# Patient Record
Sex: FEMALE | Race: WHITE | NOT HISPANIC OR LATINO | URBAN - METROPOLITAN AREA
[De-identification: names, ages, dates, MRNs, and addresses within clinical notes are randomized per-mention and may not be internally consistent; named-entity substitution may affect disease eponyms.]

---

## 2017-11-21 ENCOUNTER — INPATIENT (INPATIENT)
Facility: HOSPITAL | Age: 82
LOS: 5 days | Discharge: NURSING, REHAB OR LONG-TERM CARE | End: 2017-11-27
Attending: INTERNAL MEDICINE | Admitting: INTERNAL MEDICINE
Payer: MEDICARE

## 2017-11-21 VITALS
DIASTOLIC BLOOD PRESSURE: 72 MMHG | HEART RATE: 77 BPM | SYSTOLIC BLOOD PRESSURE: 123 MMHG | TEMPERATURE: 98 F | OXYGEN SATURATION: 99 % | RESPIRATION RATE: 20 BRPM

## 2017-11-21 DIAGNOSIS — F03.90 UNSPECIFIED DEMENTIA WITHOUT BEHAVIORAL DISTURBANCE: ICD-10-CM

## 2017-11-21 DIAGNOSIS — G92 TOXIC ENCEPHALOPATHY: ICD-10-CM

## 2017-11-21 DIAGNOSIS — Z29.9 ENCOUNTER FOR PROPHYLACTIC MEASURES, UNSPECIFIED: ICD-10-CM

## 2017-11-21 DIAGNOSIS — N39.0 URINARY TRACT INFECTION, SITE NOT SPECIFIED: ICD-10-CM

## 2017-11-21 DIAGNOSIS — G40.909 EPILEPSY, UNSPECIFIED, NOT INTRACTABLE, WITHOUT STATUS EPILEPTICUS: ICD-10-CM

## 2017-11-21 DIAGNOSIS — Z85.3 PERSONAL HISTORY OF MALIGNANT NEOPLASM OF BREAST: ICD-10-CM

## 2017-11-21 DIAGNOSIS — I10 ESSENTIAL (PRIMARY) HYPERTENSION: ICD-10-CM

## 2017-11-21 DIAGNOSIS — Z90.11 ACQUIRED ABSENCE OF RIGHT BREAST AND NIPPLE: Chronic | ICD-10-CM

## 2017-11-21 DIAGNOSIS — Z84.89 FAMILY HISTORY OF OTHER SPECIFIED CONDITIONS: Chronic | ICD-10-CM

## 2017-11-21 LAB
ALBUMIN SERPL ELPH-MCNC: 4.1 G/DL — SIGNIFICANT CHANGE UP (ref 3.3–5)
ALP SERPL-CCNC: 91 U/L — SIGNIFICANT CHANGE UP (ref 40–120)
ALT FLD-CCNC: 10 U/L — SIGNIFICANT CHANGE UP (ref 4–33)
APPEARANCE UR: SIGNIFICANT CHANGE UP
AST SERPL-CCNC: 19 U/L — SIGNIFICANT CHANGE UP (ref 4–32)
BACTERIA # UR AUTO: HIGH
BASOPHILS # BLD AUTO: 0.03 K/UL — SIGNIFICANT CHANGE UP (ref 0–0.2)
BASOPHILS NFR BLD AUTO: 0.5 % — SIGNIFICANT CHANGE UP (ref 0–2)
BILIRUB SERPL-MCNC: 0.3 MG/DL — SIGNIFICANT CHANGE UP (ref 0.2–1.2)
BILIRUB UR-MCNC: NEGATIVE — SIGNIFICANT CHANGE UP
BLOOD UR QL VISUAL: NEGATIVE — SIGNIFICANT CHANGE UP
BUN SERPL-MCNC: 12 MG/DL — SIGNIFICANT CHANGE UP (ref 7–23)
CALCIUM SERPL-MCNC: 8.5 MG/DL — SIGNIFICANT CHANGE UP (ref 8.4–10.5)
CHLORIDE SERPL-SCNC: 97 MMOL/L — LOW (ref 98–107)
CO2 SERPL-SCNC: 29 MMOL/L — SIGNIFICANT CHANGE UP (ref 22–31)
COLOR SPEC: YELLOW — SIGNIFICANT CHANGE UP
CREAT SERPL-MCNC: 0.46 MG/DL — LOW (ref 0.5–1.3)
EOSINOPHIL # BLD AUTO: 0.02 K/UL — SIGNIFICANT CHANGE UP (ref 0–0.5)
EOSINOPHIL NFR BLD AUTO: 0.4 % — SIGNIFICANT CHANGE UP (ref 0–6)
GLUCOSE SERPL-MCNC: 87 MG/DL — SIGNIFICANT CHANGE UP (ref 70–99)
GLUCOSE UR-MCNC: NEGATIVE — SIGNIFICANT CHANGE UP
HCT VFR BLD CALC: 41.9 % — SIGNIFICANT CHANGE UP (ref 34.5–45)
HGB BLD-MCNC: 12.9 G/DL — SIGNIFICANT CHANGE UP (ref 11.5–15.5)
HYALINE CASTS # UR AUTO: SIGNIFICANT CHANGE UP (ref 0–?)
IMM GRANULOCYTES # BLD AUTO: 0.02 # — SIGNIFICANT CHANGE UP
IMM GRANULOCYTES NFR BLD AUTO: 0.4 % — SIGNIFICANT CHANGE UP (ref 0–1.5)
KETONES UR-MCNC: NEGATIVE — SIGNIFICANT CHANGE UP
LEUKOCYTE ESTERASE UR-ACNC: HIGH
LYMPHOCYTES # BLD AUTO: 0.76 K/UL — LOW (ref 1–3.3)
LYMPHOCYTES # BLD AUTO: 13.6 % — SIGNIFICANT CHANGE UP (ref 13–44)
MCHC RBC-ENTMCNC: 21.8 PG — LOW (ref 27–34)
MCHC RBC-ENTMCNC: 30.8 % — LOW (ref 32–36)
MCV RBC AUTO: 70.7 FL — LOW (ref 80–100)
MONOCYTES # BLD AUTO: 0.41 K/UL — SIGNIFICANT CHANGE UP (ref 0–0.9)
MONOCYTES NFR BLD AUTO: 7.3 % — SIGNIFICANT CHANGE UP (ref 2–14)
MUCOUS THREADS # UR AUTO: SIGNIFICANT CHANGE UP
NEUTROPHILS # BLD AUTO: 4.34 K/UL — SIGNIFICANT CHANGE UP (ref 1.8–7.4)
NEUTROPHILS NFR BLD AUTO: 77.8 % — HIGH (ref 43–77)
NITRITE UR-MCNC: POSITIVE — HIGH
NRBC # FLD: 0 — SIGNIFICANT CHANGE UP
PH UR: 6.5 — SIGNIFICANT CHANGE UP (ref 4.6–8)
PHENOBARB SERPL-MCNC: 24.6 UG/ML — SIGNIFICANT CHANGE UP (ref 10–40)
PLATELET # BLD AUTO: 239 K/UL — SIGNIFICANT CHANGE UP (ref 150–400)
PMV BLD: 9.9 FL — SIGNIFICANT CHANGE UP (ref 7–13)
POTASSIUM SERPL-MCNC: 5 MMOL/L — SIGNIFICANT CHANGE UP (ref 3.5–5.3)
POTASSIUM SERPL-SCNC: 5 MMOL/L — SIGNIFICANT CHANGE UP (ref 3.5–5.3)
PROT SERPL-MCNC: 7.5 G/DL — SIGNIFICANT CHANGE UP (ref 6–8.3)
PROT UR-MCNC: 10 — SIGNIFICANT CHANGE UP
RBC # BLD: 5.93 M/UL — HIGH (ref 3.8–5.2)
RBC # FLD: 14.9 % — HIGH (ref 10.3–14.5)
RBC CASTS # UR COMP ASSIST: HIGH (ref 0–?)
SODIUM SERPL-SCNC: 135 MMOL/L — SIGNIFICANT CHANGE UP (ref 135–145)
SP GR SPEC: 1.02 — SIGNIFICANT CHANGE UP (ref 1–1.03)
SQUAMOUS # UR AUTO: SIGNIFICANT CHANGE UP
UROBILINOGEN FLD QL: NORMAL E.U. — SIGNIFICANT CHANGE UP (ref 0.1–0.2)
WBC # BLD: 5.58 K/UL — SIGNIFICANT CHANGE UP (ref 3.8–10.5)
WBC # FLD AUTO: 5.58 K/UL — SIGNIFICANT CHANGE UP (ref 3.8–10.5)
WBC CLUMPS #/AREA URNS HPF: PRESENT — HIGH (ref 0–?)
WBC UR QL: >50 — HIGH (ref 0–?)
YEAST BUDDING # UR COMP ASSIST: SIGNIFICANT CHANGE UP

## 2017-11-21 PROCEDURE — 70450 CT HEAD/BRAIN W/O DYE: CPT | Mod: 26

## 2017-11-21 PROCEDURE — 71010: CPT | Mod: 26

## 2017-11-21 PROCEDURE — 99223 1ST HOSP IP/OBS HIGH 75: CPT

## 2017-11-21 RX ORDER — CEFTRIAXONE 500 MG/1
1 INJECTION, POWDER, FOR SOLUTION INTRAMUSCULAR; INTRAVENOUS ONCE
Qty: 0 | Refills: 0 | Status: COMPLETED | OUTPATIENT
Start: 2017-11-21 | End: 2017-11-21

## 2017-11-21 RX ORDER — FOLIC ACID 0.8 MG
1 TABLET ORAL DAILY
Qty: 0 | Refills: 0 | Status: DISCONTINUED | OUTPATIENT
Start: 2017-11-21 | End: 2017-11-27

## 2017-11-21 RX ORDER — CHOLECALCIFEROL (VITAMIN D3) 125 MCG
5000 CAPSULE ORAL DAILY
Qty: 0 | Refills: 0 | Status: DISCONTINUED | OUTPATIENT
Start: 2017-11-21 | End: 2017-11-27

## 2017-11-21 RX ORDER — SENNA PLUS 8.6 MG/1
2 TABLET ORAL AT BEDTIME
Qty: 0 | Refills: 0 | Status: DISCONTINUED | OUTPATIENT
Start: 2017-11-21 | End: 2017-11-27

## 2017-11-21 RX ORDER — RALOXIFENE HYDROCHLORIDE 60 MG/1
60 TABLET, COATED ORAL DAILY
Qty: 0 | Refills: 0 | Status: DISCONTINUED | OUTPATIENT
Start: 2017-11-21 | End: 2017-11-27

## 2017-11-21 RX ORDER — CEFTRIAXONE 500 MG/1
1000 INJECTION, POWDER, FOR SOLUTION INTRAMUSCULAR; INTRAVENOUS ONCE
Qty: 0 | Refills: 0 | Status: DISCONTINUED | OUTPATIENT
Start: 2017-11-21 | End: 2017-11-21

## 2017-11-21 RX ORDER — DONEPEZIL HYDROCHLORIDE 10 MG/1
10 TABLET, FILM COATED ORAL AT BEDTIME
Qty: 0 | Refills: 0 | Status: DISCONTINUED | OUTPATIENT
Start: 2017-11-21 | End: 2017-11-27

## 2017-11-21 RX ORDER — HEPARIN SODIUM 5000 [USP'U]/ML
5000 INJECTION INTRAVENOUS; SUBCUTANEOUS EVERY 8 HOURS
Qty: 0 | Refills: 0 | Status: DISCONTINUED | OUTPATIENT
Start: 2017-11-21 | End: 2017-11-27

## 2017-11-21 RX ORDER — LAMOTRIGINE 25 MG/1
600 TABLET, ORALLY DISINTEGRATING ORAL DAILY
Qty: 0 | Refills: 0 | Status: DISCONTINUED | OUTPATIENT
Start: 2017-11-21 | End: 2017-11-25

## 2017-11-21 RX ORDER — PHENOBARBITAL 60 MG
64.8 TABLET ORAL EVERY 12 HOURS
Qty: 0 | Refills: 0 | Status: DISCONTINUED | OUTPATIENT
Start: 2017-11-21 | End: 2017-11-23

## 2017-11-21 RX ORDER — SODIUM CHLORIDE 9 MG/ML
1 INJECTION INTRAMUSCULAR; INTRAVENOUS; SUBCUTANEOUS DAILY
Qty: 0 | Refills: 0 | Status: DISCONTINUED | OUTPATIENT
Start: 2017-11-21 | End: 2017-11-27

## 2017-11-21 RX ORDER — DOCUSATE SODIUM 100 MG
100 CAPSULE ORAL THREE TIMES A DAY
Qty: 0 | Refills: 0 | Status: DISCONTINUED | OUTPATIENT
Start: 2017-11-21 | End: 2017-11-27

## 2017-11-21 RX ORDER — AMLODIPINE BESYLATE 2.5 MG/1
5 TABLET ORAL DAILY
Qty: 0 | Refills: 0 | Status: DISCONTINUED | OUTPATIENT
Start: 2017-11-21 | End: 2017-11-27

## 2017-11-21 RX ADMIN — Medication 64.8 MILLIGRAM(S): at 18:46

## 2017-11-21 RX ADMIN — DONEPEZIL HYDROCHLORIDE 10 MILLIGRAM(S): 10 TABLET, FILM COATED ORAL at 22:18

## 2017-11-21 RX ADMIN — Medication 100 MILLIGRAM(S): at 22:18

## 2017-11-21 RX ADMIN — HEPARIN SODIUM 5000 UNIT(S): 5000 INJECTION INTRAVENOUS; SUBCUTANEOUS at 22:19

## 2017-11-21 RX ADMIN — CEFTRIAXONE 100 GRAM(S): 500 INJECTION, POWDER, FOR SOLUTION INTRAMUSCULAR; INTRAVENOUS at 13:02

## 2017-11-21 NOTE — ED PROVIDER NOTE - PROGRESS NOTE DETAILS
UA + for UTI. CTH neg. Will give dose of ceftriaxone in ED. Will need admission for inpt tx of UTI, poss urosepsis. Ucx and blood cx pending. Discussed case with pt's PCP Dr. Samir Jaramillo. Would like admission to hospitalist. Discussed case with hospitalist Dr. Huong Prado. Will admit for tx of UTI.

## 2017-11-21 NOTE — PATIENT PROFILE ADULT. - VISION (WITH CORRECTIVE LENSES IF THE PATIENT USUALLY WEARS THEM):
Unable to assess (HERNANDEZ) Pt. does not respond when asked and has garbled speech/Normal vision: sees adequately in most situations; can see medication labels, newsprint

## 2017-11-21 NOTE — ED ADULT TRIAGE NOTE - NS ED NOTE AC HIGH RISK COUNTRIES
1.  Date/reason for appt: 10/10/17 IBD Sx's   2.  Referring provider: Maksim FLORES   3.  Call to patient (Yes / No - short description): No, pt was referred.   4.  Previous care at / records requested from:  [facility] FLOR[facility] -- Recs are in Epic / Images in PACS   [facility] Donna ESTRADA -- Recs are in Epic   Colonoscopy 10/08/15  Transferred recs scanned in Epic 2015    No

## 2017-11-21 NOTE — ED PROVIDER NOTE - MEDICAL DECISION MAKING DETAILS
81yo F hx of severe intellectual disability, schizophrenia, seizure d/o, osteoporosis, peripheral neuropathy p/w AMS x1hr. Ddx include infection, sz, dementia, ACS. Eval with labs, ekg, cxr, cthead, ua, lamotrigine and phenobarb levels, reassess.

## 2017-11-21 NOTE — H&P ADULT - PROBLEM SELECTOR PLAN 3
c/w Amlodipine for HTN, BP satisfactory right now c/w Phenobarbital and Lamictal at scheduled doses for home, not actively seizing  CT Head negative on initial triage for lethargy c/w Phenobarbital and Lamictal at scheduled doses for home, not actively seizing  CT Head negative on initial triage for acute intracranial hemorrhage or masses

## 2017-11-21 NOTE — ED ADULT TRIAGE NOTE - CHIEF COMPLAINT QUOTE
Pt from adult home sent in for increased lethargy. Pt with slurred speech at base line.  Pt is afebrile finger stick 106.

## 2017-11-21 NOTE — H&P ADULT - PROBLEM SELECTOR PLAN 1
(+) UA and suprapubic tenderness on physical exam. Would c/w Rocephin and follow up urine cultures. Patient is not septic at this time.   - CBC daily  - Vitals q6h Patient with altered mental status in the form of lethargy and decreased interaction on presentation. As per HHA, this has now resolved after antibiotic therapy. Likely due to UTI. C/w abx and conitnue to monitor clinical progress

## 2017-11-21 NOTE — ED ADULT NURSE NOTE - OBJECTIVE STATEMENT
Patient received into room 4 AA&Ox0 with mental disability sent from facility for lethargy with aide at beside providing history. Per aide, pt. was lethargic/sluggish, and not eating for about an hour at approximately 0800 but returned to baseline with no interventions. VSS on RA. Patient presents to ED with no non-verbal s/x of chest pain, N/V, SOB, fever, chills at this time. Patient is incontinent x2, on bedrest to chair with stephanie lift at nursing home, and presents to ED with [id number] small blanchable unopened bump to sacral area. 22g PIV in place to left hand, labs drawn - will continue to monitor.

## 2017-11-21 NOTE — H&P ADULT - NSHPPHYSICALEXAM_GEN_ALL_CORE
Vital Signs Last 24 Hrs  T(C): 36.9 (21 Nov 2017 11:40), Max: 36.9 (21 Nov 2017 11:40)  T(F): 98.4 (21 Nov 2017 11:40), Max: 98.4 (21 Nov 2017 11:40)  HR: 74 (21 Nov 2017 11:40) (74 - 77)  BP: 141/84 (21 Nov 2017 11:40) (123/72 - 141/84)  BP(mean): --  RR: 18 (21 Nov 2017 11:40) (18 - 20)  SpO2: 99% (21 Nov 2017 11:40) (99% - 99%)    General: NAD, non-toxic appearing, (+) repetitive movement of lips in circular motion, unintelligble speech but awake and interactive, calm, appears younger than stated age   HEENT: EOMI, PERRLA, no conjunctival pallor, MMM, no JVD, no thyromegaly, neck supple, trachea midline  CV: S1S2 RRR no MRG  Lungs: CTA BL  Abdomen: soft ND +BS, (+) grimaced on palpation of the suprapubic area  Extremities: No CCE +WWP  Skin/MSK: No rashes, preserved ROM on active & passive movement  Neuro: AAOx0, no focal deficits, no sensory deficits

## 2017-11-21 NOTE — H&P ADULT - ASSESSMENT
82F hx of Intellectual disability, Seizure disorder, Schizophrenia, Osteoporosis, Breast CA s/p Right Mastectomy, Peripheral Neuropathy presents to [facility] from group home for lethargy.  Found to have urinary tract infection

## 2017-11-21 NOTE — H&P ADULT - HISTORY OF PRESENT ILLNESS
82F hx of Intellectual disability, Seizure disorder, Schizophrenia, Osteoporosis, Hyponatremia, Breast CA s/p Right Mastectomy, Peripheral Neuropathy presents to [facility] from group home for lethargy. Hx obtained from aide given patient's intellectually disability and inability to participate in full discourse. As per the health aide, the patient was in her usual state of health with no symptoms until this morning. She slept well overnight. The patient's usual routine is to have breakfast and to be awake, alert, and interactive albeit nonintelligible speech. However this morning the patient was having a difficult time waking up, and would not eat. Her personality change was alarming to the HHA and thus she was brought to the ER. Upon review of the documentation from the group home, her vitals were within normal limits and no fever.     In the ED patient was given 1g IV Ceftriaxone x 1 (13:00).     Unable to interview patient given intellectual disability. HHA did not observe any coughing, diarrhea for patient at the home.

## 2017-11-21 NOTE — ED PROVIDER NOTE - OBJECTIVE STATEMENT
83yo F hx of severe intellectual disability, schizophrenia, seizure d/o, osteoperosis, peripheral neuropathy presenting from nursing home for lethargy. History gathered from health aide. Unable to understand pt 2/2 mumbled speech (baseline for pt). Health aide reports around 8am, pt was lethargic, acting very tired, sluggish, not eating, not very responsive. Lasted about an hour. Did not give pt anything and pt has subsequently returned to baseline. Pt at baseline has a dry cough from former smoking, which hasn't changed. No recent illnesses, change in meds, fevers/chills, vomiting, constipation, diarrhea, abdominal pain, urinary symptoms, rashes. Pt is supposed to wear CPAP at night but pt does not comply.

## 2017-11-21 NOTE — H&P ADULT - PROBLEM SELECTOR PLAN 2
c/w Phenobarbital and Lamictal at scheduled doses for home, not actively seizing  CT Head negative on initial triage for lethargy (+) UA and suprapubic tenderness on physical exam. Would c/w Rocephin and follow up urine cultures. Patient is not septic at this time.   - CBC daily  - Vitals q6h

## 2017-11-21 NOTE — ED PROVIDER NOTE - NS ED ROS FT
Constitutional: no fevers, chills  HEENT: no visual changes, no sore throat, no rhinorrhea  CV: no cp  Resp: no sob  GI: no abd pain, n/v, diarrhea/constipation  : no dysuria, hematuria  MSK: no joint pains  skin: no rashes  neuro: no HA, + lethargy  psych: no SI/HI

## 2017-11-21 NOTE — ED PROVIDER NOTE - ATTENDING CONTRIBUTION TO CARE
Farhan pt sent from nursing home with reported AMS  presently at baseline  acc to aid at bedside  Aid says  looked sleepy this am  This has happened before  Pt baseline disoriented Farhan pt sent from nursing home with reported AMS  presently at baseline  acc to aid at bedside  Aid says  looked sleepy this am,  lasted about 1 hr  no sz activity observed    Pt baseline disoriented  and acc to aid seems at baseline now  exam pt disoriented  speaking but not answering question  RR nl faint ant rhonchi  benign ab  moves both sides with stimulation  hands held flexed at wrists  left hip int rotated but able to range passively and moves it with stimulation    CXR clear  Urine positive Farhan pt sent from nursing home with reported AMS  presently at baseline  acc to aid at bedside  Aid says  looked sleepy this am,  lasted about 1 hr  no sz activity observed    Pt baseline disoriented  and acc to aid seems at baseline now  exam pt disoriented  speaking but not answering question  RR nl faint ant rhonchi  benign ab  moves both sides with stimulation  hands held flexed at wrists  left hip int rotated but able to range passively and moves it with stimulation    CXR clear  Urine positive  Imp  UTI  transient AMS   ?related to infection  pts baseline poor so difficult to assess well  would keep for antibiotics  serial assessments

## 2017-11-21 NOTE — H&P ADULT - PMH
Dementia    Hearing loss    Osteoporosis    Peripheral neuropathy    Schizophrenia, unspecified type

## 2017-11-21 NOTE — H&P ADULT - NSHPLABSRESULTS_GEN_ALL_CORE
CBC Full  -  ( 21 Nov 2017 11:35 )  WBC Count : 5.58 K/uL  Hemoglobin : 12.9 g/dL  Hematocrit : 41.9 %  Platelet Count - Automated : 239 K/uL  Mean Cell Volume : 70.7 fL  Mean Cell Hemoglobin : 21.8 pg  Mean Cell Hemoglobin Concentration : 30.8 %  Auto Neutrophil # : 4.34 K/uL  Auto Lymphocyte # : 0.76 K/uL  Auto Monocyte # : 0.41 K/uL  Auto Eosinophil # : 0.02 K/uL  Auto Basophil # : 0.03 K/uL  Auto Neutrophil % : 77.8 %  Auto Lymphocyte % : 13.6 %  Auto Monocyte % : 7.3 %  Auto Eosinophil % : 0.4 %  Auto Basophil % : 0.5 %    11-21    135  |  97<L>  |  12  ----------------------------<  87  5.0   |  29  |  0.46<L>    Ca    8.5      21 Nov 2017 11:35    TPro  7.5  /  Alb  4.1  /  TBili  0.3  /  DBili  x   /  AST  19  /  ALT  10  /  AlkPhos  91 11-21    CBC Full  -  ( 21 Nov 2017 11:35 )  WBC Count : 5.58 K/uL  Hemoglobin : 12.9 g/dL  Hematocrit : 41.9 %  Platelet Count - Automated : 239 K/uL  Mean Cell Volume : 70.7 fL  Mean Cell Hemoglobin : 21.8 pg  Mean Cell Hemoglobin Concentration : 30.8 %  Auto Neutrophil # : 4.34 K/uL  Auto Lymphocyte # : 0.76 K/uL  Auto Monocyte # : 0.41 K/uL  Auto Eosinophil # : 0.02 K/uL  Auto Basophil # : 0.03 K/uL  Auto Neutrophil % : 77.8 %  Auto Lymphocyte % : 13.6 %  Auto Monocyte % : 7.3 %  Auto Eosinophil % : 0.4 %  Auto Basophil % : 0.5 %    11-21    135  |  97<L>  |  12  ----------------------------<  87  5.0   |  29  |  0.46<L>    Ca    8.5      21 Nov 2017 11:35    TPro  7.5  /  Alb  4.1  /  TBili  0.3  /  DBili  x   /  AST  19  /  ALT  10  /  AlkPhos  91  11-21    Urinalysis (11.21.17 @ 11:09)    Color: YELLOW    Urine Appearance: CLOUDY    Glucose: NEGATIVE    Bilirubin: NEGATIVE    Ketone - Urine: NEGATIVE    Specific Gravity: 1.017    Blood: NEGATIVE    pH - Urine: 6.5    Protein, Urine: 10    Urobilinogen: NORMAL E.U.    Nitrite: POSITIVE    Leukocyte Esterase Concentration: LARGE    Red Blood Cell - Urine: 5-10    White Blood Cell - Urine: >50    Hyaline Casts: 0-2    Mucus: FEW    White Blood Cell Clump: PRESENT    Bacteria: MANY    Budding Yeast: FEW    Squamous Epithelial: OCC    Phenobarbital Level, Serum (11.21.17 @ 11:35)    Phenobarbital Level, Serum: 24.6 ug/mL    < from: CT Head No Cont (11.21.17 @ 12:47) >  IMPRESSION:  No acute intracranial hemorrhage, mass or hydrocephalus.  Mild microvascular ischemic change.  ELVA PETERS M.D., ATTENDING RADIOLOGIST  This document has been electronically signed. Nov 21 2017  1:08PM   < end of copied text >    CXR: No effusions, poor technique as head obscures upper lobes, no focal consolidations on lower lobes

## 2017-11-21 NOTE — PATIENT PROFILE ADULT. - ABILITY TO HEAR (WITH HEARING AID OR HEARING APPLIANCE IF NORMALLY USED):
Unable to assess (HERNANDEZ) Pt. does not respond when asked and has garbled speech/Unable to assess hearing

## 2017-11-21 NOTE — ED PROVIDER NOTE - PHYSICAL EXAMINATION
Vitals: WNL  Gen: laying in bed with incoherent mumbling  Head: NCAT  ENT: sclerae white, anicterus, moist mucous membranes. No exudates  CV: RRR. Audible S1 and S2. No murmurs, rubs, gallops, S3, nor S4, 2+ radial and DP pulses   Pulm: crackles RLL, remainder of lungs CTA  Abd: soft, normoactive BS x4, NTND, no rebound, no guarding, no rashes  Musculoskeletal:  No peripheral edema, severe thoracic kyphosis  Skin: no lesions or scars noted  Neurologic: pt able to verbalize name, incoherent mumbling, unable to understand pt 2/2 pt's mumbling and intellectual disability, pt does not follow commands for neuro exam, EOMI

## 2017-11-22 LAB
SPECIMEN SOURCE: SIGNIFICANT CHANGE UP

## 2017-11-22 PROCEDURE — 99233 SBSQ HOSP IP/OBS HIGH 50: CPT

## 2017-11-22 RX ORDER — CEFTRIAXONE 500 MG/1
1 INJECTION, POWDER, FOR SOLUTION INTRAMUSCULAR; INTRAVENOUS EVERY 24 HOURS
Qty: 0 | Refills: 0 | Status: DISCONTINUED | OUTPATIENT
Start: 2017-11-22 | End: 2017-11-24

## 2017-11-22 RX ADMIN — Medication 1 MILLIGRAM(S): at 12:29

## 2017-11-22 RX ADMIN — SODIUM CHLORIDE 1 GRAM(S): 9 INJECTION INTRAMUSCULAR; INTRAVENOUS; SUBCUTANEOUS at 12:29

## 2017-11-22 RX ADMIN — Medication 64.8 MILLIGRAM(S): at 05:34

## 2017-11-22 RX ADMIN — Medication 100 MILLIGRAM(S): at 21:17

## 2017-11-22 RX ADMIN — Medication 5000 UNIT(S): at 12:28

## 2017-11-22 RX ADMIN — RALOXIFENE HYDROCHLORIDE 60 MILLIGRAM(S): 60 TABLET, COATED ORAL at 12:28

## 2017-11-22 RX ADMIN — Medication 100 MILLIGRAM(S): at 05:37

## 2017-11-22 RX ADMIN — AMLODIPINE BESYLATE 5 MILLIGRAM(S): 2.5 TABLET ORAL at 05:34

## 2017-11-22 RX ADMIN — HEPARIN SODIUM 5000 UNIT(S): 5000 INJECTION INTRAVENOUS; SUBCUTANEOUS at 05:37

## 2017-11-22 RX ADMIN — HEPARIN SODIUM 5000 UNIT(S): 5000 INJECTION INTRAVENOUS; SUBCUTANEOUS at 16:06

## 2017-11-22 RX ADMIN — DONEPEZIL HYDROCHLORIDE 10 MILLIGRAM(S): 10 TABLET, FILM COATED ORAL at 21:17

## 2017-11-22 RX ADMIN — Medication 100 MILLIGRAM(S): at 16:06

## 2017-11-22 RX ADMIN — Medication 1 TABLET(S): at 12:29

## 2017-11-22 RX ADMIN — HEPARIN SODIUM 5000 UNIT(S): 5000 INJECTION INTRAVENOUS; SUBCUTANEOUS at 21:17

## 2017-11-22 RX ADMIN — CEFTRIAXONE 100 GRAM(S): 500 INJECTION, POWDER, FOR SOLUTION INTRAMUSCULAR; INTRAVENOUS at 16:06

## 2017-11-22 RX ADMIN — LAMOTRIGINE 600 MILLIGRAM(S): 25 TABLET, ORALLY DISINTEGRATING ORAL at 14:21

## 2017-11-22 RX ADMIN — Medication 64.8 MILLIGRAM(S): at 17:32

## 2017-11-22 RX ADMIN — SENNA PLUS 2 TABLET(S): 8.6 TABLET ORAL at 21:17

## 2017-11-22 NOTE — PROGRESS NOTE ADULT - SUBJECTIVE AND OBJECTIVE BOX
Patient is a 82y old  Female who presents with a chief complaint of Lethargy (2017 14:56)        SUBJECTIVE / OVERNIGHT EVENTS:  no acute events o/n  unable to interview pt given intellectual disability  no apparent distress     MEDICATIONS  (STANDING):  amLODIPine   Tablet 5 milliGRAM(s) Oral daily  calcium carbonate 1250 mG + Vitamin D (OsCal 500 + D) 1 Tablet(s) Oral daily  cefTRIAXone   IVPB 1 Gram(s) IV Intermittent every 24 hours  cholecalciferol 5000 Unit(s) Oral daily  docusate sodium 100 milliGRAM(s) Oral three times a day  donepezil 10 milliGRAM(s) Oral at bedtime  folic acid 1 milliGRAM(s) Oral daily  heparin  Injectable 5000 Unit(s) SubCutaneous every 8 hours  lamoTRIgine  milliGRAM(s) Oral daily  PHENobarbital 64.8 milliGRAM(s) Oral every 12 hours  raloxifene 60 milliGRAM(s) Oral daily  senna 2 Tablet(s) Oral at bedtime  sodium chloride 1 Gram(s) Oral daily    MEDICATIONS  (PRN):      Vital Signs Last 24 Hrs  T(C): 36.4 (2017 05:27), Max: 36.8 (2017 15:33)  T(F): 97.6 (2017 05:27), Max: 98.2 (2017 15:33)  HR: 77 (2017 05:27) (65 - 84)  BP: 150/87 (2017 05:27) (138/79 - 150/87)  BP(mean): --  RR: 17 (2017 05:27) (17 - 18)  SpO2: 95% (2017 05:27) (95% - 100%)  CAPILLARY BLOOD GLUCOSE        I&O's Summary      General: NAD  HEENT: EOMI, PERRLA  CV: S1S2 RRR no MRG  Lungs: CTA BL  Abdomen: soft NT/ND +BS   Extremities: No CCE  Skin/MSK: No rashes, preserved ROM on active & passive movement  Neuro: AAOx0, no focal deficits, no sensory deficits        LABS:                        12.9   5.58  )-----------( 239      ( 2017 11:35 )             41.9     11-21    135  |  97<L>  |  12  ----------------------------<  87  5.0   |  29  |  0.46<L>    Ca    8.5      2017 11:35    TPro  7.5  /  Alb  4.1  /  TBili  0.3  /  DBili  x   /  AST  19  /  ALT  10  /  AlkPhos  91  11-21          Urinalysis Basic - ( 2017 11:09 )    Color: YELLOW / Appearance: CLOUDY / S.017 / pH: 6.5  Gluc: NEGATIVE / Ketone: NEGATIVE  / Bili: NEGATIVE / Urobili: NORMAL E.U.   Blood: NEGATIVE / Protein: 10 / Nitrite: POSITIVE   Leuk Esterase: LARGE / RBC: 5-10 / WBC >50   Sq Epi: OCC / Non Sq Epi: x / Bacteria: MANY        RADIOLOGY & ADDITIONAL TESTS:    Imaging Personally Reviewed:  Consultant(s) Notes Reviewed:    Care Discussed with Consultants/Other Providers:

## 2017-11-23 LAB
-  AMIKACIN: SIGNIFICANT CHANGE UP
-  AMPICILLIN/SULBACTAM: SIGNIFICANT CHANGE UP
-  AMPICILLIN: SIGNIFICANT CHANGE UP
-  AZTREONAM: SIGNIFICANT CHANGE UP
-  CEFAZOLIN: SIGNIFICANT CHANGE UP
-  CEFEPIME: SIGNIFICANT CHANGE UP
-  CEFOXITIN: SIGNIFICANT CHANGE UP
-  CEFTAZIDIME: SIGNIFICANT CHANGE UP
-  CEFTRIAXONE: SIGNIFICANT CHANGE UP
-  CIPROFLOXACIN: SIGNIFICANT CHANGE UP
-  ERTAPENEM: SIGNIFICANT CHANGE UP
-  GENTAMICIN: SIGNIFICANT CHANGE UP
-  IMIPENEM: SIGNIFICANT CHANGE UP
-  LEVOFLOXACIN: SIGNIFICANT CHANGE UP
-  MEROPENEM: SIGNIFICANT CHANGE UP
-  NITROFURANTOIN: SIGNIFICANT CHANGE UP
-  PIPERACILLIN/TAZOBACTAM: SIGNIFICANT CHANGE UP
-  TIGECYCLINE: SIGNIFICANT CHANGE UP
-  TOBRAMYCIN: SIGNIFICANT CHANGE UP
-  TRIMETHOPRIM/SULFAMETHOXAZOLE: SIGNIFICANT CHANGE UP
BACTERIA UR CULT: SIGNIFICANT CHANGE UP
BUN SERPL-MCNC: 11 MG/DL — SIGNIFICANT CHANGE UP (ref 7–23)
CALCIUM SERPL-MCNC: 8.3 MG/DL — LOW (ref 8.4–10.5)
CHLORIDE SERPL-SCNC: 95 MMOL/L — LOW (ref 98–107)
CO2 SERPL-SCNC: 29 MMOL/L — SIGNIFICANT CHANGE UP (ref 22–31)
CREAT SERPL-MCNC: 0.34 MG/DL — LOW (ref 0.5–1.3)
GLUCOSE SERPL-MCNC: 90 MG/DL — SIGNIFICANT CHANGE UP (ref 70–99)
HCT VFR BLD CALC: 43.1 % — SIGNIFICANT CHANGE UP (ref 34.5–45)
HGB BLD-MCNC: 12.9 G/DL — SIGNIFICANT CHANGE UP (ref 11.5–15.5)
LAMOTRIGINE SERPL-MCNC: 11.8 MCG/ML — SIGNIFICANT CHANGE UP (ref 2.5–15)
MCHC RBC-ENTMCNC: 21.4 PG — LOW (ref 27–34)
MCHC RBC-ENTMCNC: 29.9 % — LOW (ref 32–36)
MCV RBC AUTO: 71.6 FL — LOW (ref 80–100)
METHOD TYPE: SIGNIFICANT CHANGE UP
NRBC # FLD: 0 — SIGNIFICANT CHANGE UP
ORGANISM # SPEC MICROSCOPIC CNT: SIGNIFICANT CHANGE UP
ORGANISM # SPEC MICROSCOPIC CNT: SIGNIFICANT CHANGE UP
PLATELET # BLD AUTO: 243 K/UL — SIGNIFICANT CHANGE UP (ref 150–400)
PMV BLD: 10.2 FL — SIGNIFICANT CHANGE UP (ref 7–13)
POTASSIUM SERPL-MCNC: 4.8 MMOL/L — SIGNIFICANT CHANGE UP (ref 3.5–5.3)
POTASSIUM SERPL-SCNC: 4.8 MMOL/L — SIGNIFICANT CHANGE UP (ref 3.5–5.3)
RBC # BLD: 6.02 M/UL — HIGH (ref 3.8–5.2)
RBC # FLD: 14.6 % — HIGH (ref 10.3–14.5)
SODIUM SERPL-SCNC: 135 MMOL/L — SIGNIFICANT CHANGE UP (ref 135–145)
WBC # BLD: 5.28 K/UL — SIGNIFICANT CHANGE UP (ref 3.8–10.5)
WBC # FLD AUTO: 5.28 K/UL — SIGNIFICANT CHANGE UP (ref 3.8–10.5)

## 2017-11-23 PROCEDURE — 99233 SBSQ HOSP IP/OBS HIGH 50: CPT

## 2017-11-23 RX ORDER — PHENOBARBITAL 60 MG
64.8 TABLET ORAL EVERY 12 HOURS
Qty: 0 | Refills: 0 | Status: DISCONTINUED | OUTPATIENT
Start: 2017-11-23 | End: 2017-11-27

## 2017-11-23 RX ADMIN — Medication 64.8 MILLIGRAM(S): at 05:00

## 2017-11-23 RX ADMIN — DONEPEZIL HYDROCHLORIDE 10 MILLIGRAM(S): 10 TABLET, FILM COATED ORAL at 21:40

## 2017-11-23 RX ADMIN — Medication 1 TABLET(S): at 12:25

## 2017-11-23 RX ADMIN — Medication 100 MILLIGRAM(S): at 05:00

## 2017-11-23 RX ADMIN — CEFTRIAXONE 100 GRAM(S): 500 INJECTION, POWDER, FOR SOLUTION INTRAMUSCULAR; INTRAVENOUS at 17:06

## 2017-11-23 RX ADMIN — HEPARIN SODIUM 5000 UNIT(S): 5000 INJECTION INTRAVENOUS; SUBCUTANEOUS at 05:00

## 2017-11-23 RX ADMIN — HEPARIN SODIUM 5000 UNIT(S): 5000 INJECTION INTRAVENOUS; SUBCUTANEOUS at 12:25

## 2017-11-23 RX ADMIN — Medication 64.8 MILLIGRAM(S): at 17:25

## 2017-11-23 RX ADMIN — HEPARIN SODIUM 5000 UNIT(S): 5000 INJECTION INTRAVENOUS; SUBCUTANEOUS at 21:40

## 2017-11-23 RX ADMIN — Medication 5000 UNIT(S): at 12:25

## 2017-11-23 RX ADMIN — LAMOTRIGINE 600 MILLIGRAM(S): 25 TABLET, ORALLY DISINTEGRATING ORAL at 12:25

## 2017-11-23 RX ADMIN — Medication 100 MILLIGRAM(S): at 21:40

## 2017-11-23 RX ADMIN — Medication 100 MILLIGRAM(S): at 12:25

## 2017-11-23 RX ADMIN — RALOXIFENE HYDROCHLORIDE 60 MILLIGRAM(S): 60 TABLET, COATED ORAL at 12:25

## 2017-11-23 RX ADMIN — SENNA PLUS 2 TABLET(S): 8.6 TABLET ORAL at 21:40

## 2017-11-23 RX ADMIN — SODIUM CHLORIDE 1 GRAM(S): 9 INJECTION INTRAMUSCULAR; INTRAVENOUS; SUBCUTANEOUS at 12:25

## 2017-11-23 RX ADMIN — AMLODIPINE BESYLATE 5 MILLIGRAM(S): 2.5 TABLET ORAL at 05:00

## 2017-11-23 RX ADMIN — Medication 1 MILLIGRAM(S): at 12:25

## 2017-11-23 NOTE — PROGRESS NOTE ADULT - SUBJECTIVE AND OBJECTIVE BOX
Patient is a 82y old  Female who presents with a chief complaint of Lethargy       patient seen and examine at bed side   no acute issue       MEDICATIONS  (STANDING):  amLODIPine   Tablet 5 milliGRAM(s) Oral daily  calcium carbonate 1250 mG + Vitamin D (OsCal 500 + D) 1 Tablet(s) Oral daily  cefTRIAXone   IVPB 1 Gram(s) IV Intermittent every 24 hours  cholecalciferol 5000 Unit(s) Oral daily  docusate sodium 100 milliGRAM(s) Oral three times a day  donepezil 10 milliGRAM(s) Oral at bedtime  folic acid 1 milliGRAM(s) Oral daily  heparin  Injectable 5000 Unit(s) SubCutaneous every 8 hours  lamoTRIgine  milliGRAM(s) Oral daily  PHENobarbital 64.8 milliGRAM(s) Oral every 12 hours  raloxifene 60 milliGRAM(s) Oral daily  senna 2 Tablet(s) Oral at bedtime  sodium chloride 1 Gram(s) Oral daily    MEDICATIONS  (PRN):      Vital Signs Last 24 Hrs  T(C): 36.7 (2017 04:57), Max: 37.1 (2017 20:45)  T(F): 98 (2017 04:57), Max: 98.7 (2017 20:45)  HR: 64 (2017 04:57) (64 - 73)  BP: 160/85 (2017 04:57) (155/89 - 160/85)  BP(mean): --  RR: 18 (2017 04:57) (18 - 18)  SpO2: 98% (2017 04:57) (95% - 98%)          General: NAD  HEENT: EOMI, PERRLA  CV: S1S2 RRR no MRG  Lungs: CTA BL  Abdomen: soft NT/ND +BS   Extremities: No CCE  Skin/MSK: No rashes, preserved ROM on active & passive movement  Neuro: AAOx0, no focal deficits, no sensory deficits        LABS:                            12.9   5.28  )-----------( 243      ( 2017 06:04 )             43.1           135  |  95<L>  |  11  ----------------------------<  90  4.8   |  29  |  0.34<L>    Ca    8.3<L>      2017 06:04          Culture - Blood (collected 2017 18:08)  Source: BLOOD PERIPHERAL  Preliminary Report (2017 18:08):    NO ORGANISMS ISOLATED    NO ORGANISMS ISOLATED AT 24 HOURS    Culture - Urine (collected 2017 14:02)  Source: URINE CATHETER  Preliminary Report (2017 10:09):    GNRID^Gram Neg Giuseppe To Be Identified    COLONY COUNT: > = 100,000 CFU/ML    Culture - Blood (collected 2017 14:01)  Source: BLOOD VENOUS  Preliminary Report (2017 14:01):    NO ORGANISMS ISOLATED    NO ORGANISMS ISOLATED AT 48 HRS.        Color: YELLOW / Appearance: CLOUDY / S.017 / pH: 6.5  Gluc: NEGATIVE / Ketone: NEGATIVE  / Bili: NEGATIVE / Urobili: NORMAL E.U.   Blood: NEGATIVE / Protein: 10 / Nitrite: POSITIVE   Leuk Esterase: LARGE / RBC: 5-10 / WBC >50   Sq Epi: OCC / Non Sq Epi: x / Bacteria: MANY        RADIOLOGY & ADDITIONAL TESTS:    Imaging Personally Reviewed:  Consultant(s) Notes Reviewed:    Care Discussed with Consultants/Other Providers:

## 2017-11-24 ENCOUNTER — TRANSCRIPTION ENCOUNTER (OUTPATIENT)
Age: 82
End: 2017-11-24

## 2017-11-24 LAB
BASOPHILS # BLD AUTO: 0.04 K/UL — SIGNIFICANT CHANGE UP (ref 0–0.2)
BASOPHILS NFR BLD AUTO: 0.8 % — SIGNIFICANT CHANGE UP (ref 0–2)
BUN SERPL-MCNC: 18 MG/DL — SIGNIFICANT CHANGE UP (ref 7–23)
CALCIUM SERPL-MCNC: 8.6 MG/DL — SIGNIFICANT CHANGE UP (ref 8.4–10.5)
CHLORIDE SERPL-SCNC: 97 MMOL/L — LOW (ref 98–107)
CO2 SERPL-SCNC: 26 MMOL/L — SIGNIFICANT CHANGE UP (ref 22–31)
CREAT SERPL-MCNC: 0.33 MG/DL — LOW (ref 0.5–1.3)
EOSINOPHIL # BLD AUTO: 0.09 K/UL — SIGNIFICANT CHANGE UP (ref 0–0.5)
EOSINOPHIL NFR BLD AUTO: 1.7 % — SIGNIFICANT CHANGE UP (ref 0–6)
GLUCOSE SERPL-MCNC: 83 MG/DL — SIGNIFICANT CHANGE UP (ref 70–99)
HCT VFR BLD CALC: 41.8 % — SIGNIFICANT CHANGE UP (ref 34.5–45)
HGB BLD-MCNC: 12.9 G/DL — SIGNIFICANT CHANGE UP (ref 11.5–15.5)
HYPOCHROMIA BLD QL: SLIGHT — SIGNIFICANT CHANGE UP
IMM GRANULOCYTES # BLD AUTO: 0.01 # — SIGNIFICANT CHANGE UP
IMM GRANULOCYTES NFR BLD AUTO: 0.2 % — SIGNIFICANT CHANGE UP (ref 0–1.5)
LYMPHOCYTES # BLD AUTO: 1.41 K/UL — SIGNIFICANT CHANGE UP (ref 1–3.3)
LYMPHOCYTES # BLD AUTO: 27.1 % — SIGNIFICANT CHANGE UP (ref 13–44)
MANUAL SMEAR VERIFICATION: SIGNIFICANT CHANGE UP
MCHC RBC-ENTMCNC: 21.6 PG — LOW (ref 27–34)
MCHC RBC-ENTMCNC: 30.9 % — LOW (ref 32–36)
MCV RBC AUTO: 70.1 FL — LOW (ref 80–100)
MICROCYTES BLD QL: SLIGHT — SIGNIFICANT CHANGE UP
MONOCYTES # BLD AUTO: 0.37 K/UL — SIGNIFICANT CHANGE UP (ref 0–0.9)
MONOCYTES NFR BLD AUTO: 7.1 % — SIGNIFICANT CHANGE UP (ref 2–14)
NEUTROPHILS # BLD AUTO: 3.28 K/UL — SIGNIFICANT CHANGE UP (ref 1.8–7.4)
NEUTROPHILS NFR BLD AUTO: 63.1 % — SIGNIFICANT CHANGE UP (ref 43–77)
NRBC # FLD: 0 — SIGNIFICANT CHANGE UP
PLATELET # BLD AUTO: 183 K/UL — SIGNIFICANT CHANGE UP (ref 150–400)
PLATELET CLUMP BLD QL SMEAR: SIGNIFICANT CHANGE UP
PLATELET COUNT - ESTIMATE: NORMAL — SIGNIFICANT CHANGE UP
PMV BLD: 10.7 FL — SIGNIFICANT CHANGE UP (ref 7–13)
POTASSIUM SERPL-MCNC: 4.3 MMOL/L — SIGNIFICANT CHANGE UP (ref 3.5–5.3)
POTASSIUM SERPL-SCNC: 4.3 MMOL/L — SIGNIFICANT CHANGE UP (ref 3.5–5.3)
RBC # BLD: 5.96 M/UL — HIGH (ref 3.8–5.2)
RBC # FLD: 14.8 % — HIGH (ref 10.3–14.5)
SODIUM SERPL-SCNC: 134 MMOL/L — LOW (ref 135–145)
WBC # BLD: 5.2 K/UL — SIGNIFICANT CHANGE UP (ref 3.8–10.5)
WBC # FLD AUTO: 5.2 K/UL — SIGNIFICANT CHANGE UP (ref 3.8–10.5)

## 2017-11-24 PROCEDURE — 99239 HOSP IP/OBS DSCHRG MGMT >30: CPT

## 2017-11-24 RX ADMIN — DONEPEZIL HYDROCHLORIDE 10 MILLIGRAM(S): 10 TABLET, FILM COATED ORAL at 21:34

## 2017-11-24 RX ADMIN — Medication 64.8 MILLIGRAM(S): at 05:31

## 2017-11-24 RX ADMIN — SENNA PLUS 2 TABLET(S): 8.6 TABLET ORAL at 21:33

## 2017-11-24 RX ADMIN — Medication 5000 UNIT(S): at 11:16

## 2017-11-24 RX ADMIN — Medication 1 MILLIGRAM(S): at 11:15

## 2017-11-24 RX ADMIN — SODIUM CHLORIDE 1 GRAM(S): 9 INJECTION INTRAMUSCULAR; INTRAVENOUS; SUBCUTANEOUS at 11:15

## 2017-11-24 RX ADMIN — Medication 64.8 MILLIGRAM(S): at 17:22

## 2017-11-24 RX ADMIN — Medication 100 MILLIGRAM(S): at 21:33

## 2017-11-24 RX ADMIN — RALOXIFENE HYDROCHLORIDE 60 MILLIGRAM(S): 60 TABLET, COATED ORAL at 11:15

## 2017-11-24 RX ADMIN — HEPARIN SODIUM 5000 UNIT(S): 5000 INJECTION INTRAVENOUS; SUBCUTANEOUS at 05:31

## 2017-11-24 RX ADMIN — HEPARIN SODIUM 5000 UNIT(S): 5000 INJECTION INTRAVENOUS; SUBCUTANEOUS at 13:13

## 2017-11-24 RX ADMIN — LAMOTRIGINE 600 MILLIGRAM(S): 25 TABLET, ORALLY DISINTEGRATING ORAL at 11:15

## 2017-11-24 RX ADMIN — AMLODIPINE BESYLATE 5 MILLIGRAM(S): 2.5 TABLET ORAL at 05:31

## 2017-11-24 RX ADMIN — Medication 100 MILLIGRAM(S): at 13:13

## 2017-11-24 RX ADMIN — Medication 100 MILLIGRAM(S): at 05:31

## 2017-11-24 RX ADMIN — Medication 1 TABLET(S): at 11:15

## 2017-11-24 RX ADMIN — HEPARIN SODIUM 5000 UNIT(S): 5000 INJECTION INTRAVENOUS; SUBCUTANEOUS at 21:33

## 2017-11-24 NOTE — DISCHARGE NOTE ADULT - ABILITY TO HEAR (WITH HEARING AID OR HEARING APPLIANCE IF NORMALLY USED):
Unable to assess hearing/Unable to assess (HERNANDEZ) Pt. does not respond when asked and has garbled speech

## 2017-11-24 NOTE — DISCHARGE NOTE ADULT - CARE PROVIDER_API CALL
Pamela Jaramillo), Internal Medicine; Medical Oncology  [address] [place] [address]  Phone: [phone]  Fax: [phone]

## 2017-11-24 NOTE — PROGRESS NOTE ADULT - PROBLEM SELECTOR PLAN 7
HSQ 5000U q8h for DVT ppx  Bowel Regimen for Constipation
HSQ 5000U q8h for DVT ppx  Bowel Regimen for Constipation

## 2017-11-24 NOTE — DISCHARGE NOTE ADULT - MEDICATION SUMMARY - MEDICATIONS TO TAKE
I will START or STAY ON the medications listed below when I get home from the hospital:    freetext medication  -  -- 600 milligram(s) by mouth once a day  -- Indication: For Seizure disorder    PHENobarbital 64.8 mg oral tablet  -- 1 tab(s) by mouth every 12 hours  -- Indication: For Seizure disorder    raloxifene 60 mg oral tablet  -- 1 tab(s) by mouth once a day  -- Indication: For History of breast cancer    alendronate 70 mg oral tablet  -- 1 tab(s) by mouth once a week (on fridays)  -- Indication: For Osteoporosis    amLODIPine 5 mg oral tablet  -- 1 tab(s) by mouth once a day  -- Indication: For Essential hypertension    donepezil 10 mg oral tablet  -- 1 tab(s) by mouth once a day (at bedtime)  -- Indication: For Dementia    docusate sodium 100 mg oral capsule  -- 1 cap(s) by mouth 3 times a day  -- Indication: For bowel regimen    senna oral tablet  -- 2 tab(s) by mouth once a day (at bedtime)  -- Indication: For bowel regimen    sodium chloride 1 g oral tablet  -- 1 tab(s) by mouth once a day  -- Indication: For Hyponatremia    calcium-vitamin D 500 mg-200 intl units oral tablet  -- 1 tab(s) by mouth once a day  -- Indication: For Supplement    Thera-Tabs M oral tablet  -- 1 tab(s) by mouth once a day  -- Indication: For Supplement    cholecalciferol oral tablet  -- 5000 unit(s) by mouth once a day  -- Indication: For Supplement    folic acid 1 mg oral tablet  -- 1 tab(s) by mouth once a day  -- Indication: For Supplement

## 2017-11-24 NOTE — DISCHARGE NOTE ADULT - PLAN OF CARE
Resolved - S/P Ceftriaxone x 3 days Patient will remain free from signs and symptoms of infection.  Follow up with PMD within one week. Diet and activity as tolerated.  Please continue taking all home medications as directed. improved stable Please continue taking all home medications as directed.

## 2017-11-24 NOTE — DISCHARGE NOTE ADULT - CARE PLAN
Principal Discharge DX:	UTI (urinary tract infection)  Secondary Diagnosis:	Toxic metabolic encephalopathy  Secondary Diagnosis:	Seizure disorder  Secondary Diagnosis:	Dementia  Secondary Diagnosis:	Essential hypertension  Secondary Diagnosis:	History of breast cancer Principal Discharge DX:	UTI (urinary tract infection)  Goal:	Resolved - S/P Ceftriaxone x 3 days  Instructions for follow-up, activity and diet:	Patient will remain free from signs and symptoms of infection.  Follow up with PMD within one week. Diet and activity as tolerated.  Please continue taking all home medications as directed.  Secondary Diagnosis:	Toxic metabolic encephalopathy  Goal:	improved  Secondary Diagnosis:	Seizure disorder  Goal:	stable  Instructions for follow-up, activity and diet:	Please continue taking all home medications as directed.  Secondary Diagnosis:	Dementia  Goal:	stable  Instructions for follow-up, activity and diet:	Please continue taking all home medications as directed.  Secondary Diagnosis:	Essential hypertension  Goal:	stable  Instructions for follow-up, activity and diet:	Please continue taking all home medications as directed.  Secondary Diagnosis:	History of breast cancer  Goal:	stable  Instructions for follow-up, activity and diet:	Please continue taking all home medications as directed.

## 2017-11-24 NOTE — DISCHARGE NOTE ADULT - HOSPITAL COURSE
dx: UTI  82F hx of Intellectual disability, Seizure disorder, Schizophrenia, Osteoporosis, Breast CA s/p Right Mastectomy, Peripheral Neuropathy presents to [facility] from group home for lethargy.  Found to have UTI.    UTI   -(+) UA and suprapubic tenderness on exam  - s/p Rocephin x 3 doses  - UCx - kleb pneumo - pan sensitive    Seizure disorder    -c/w Phenobarbital and Lamictal at scheduled doses for home, not actively seizing  -CT Head negative on initial triage for lethargy.     hypertension    -c/w Amlodipine     Hx of breast CA  -s/p R mastectomy. C/w Raloxifene.     Dementia    -c/w Donepezil.     D/C back to group home dx: UTI  82F hx of Intellectual disability, Seizure disorder, Schizophrenia, Osteoporosis, Breast CA s/p Right Mastectomy, Peripheral Neuropathy presents to [facility] from group home for lethargy.  Found to have UTI.    UTI   -(+) UA and suprapubic tenderness on exam  - s/p Rocephin x 3 doses  - UCx - kleb pneumo - pan sensitive    Seizure disorder    -c/w Phenobarbital and Lamictal at scheduled doses for home, not actively seizing  -CT Head negative on initial triage for lethargy.     hypertension    -c/w Amlodipine     Hx of breast CA  -s/p R mastectomy. C/w Raloxifene.     Dementia    -c/w Donepezil.     D/C back to group home

## 2017-11-24 NOTE — PROGRESS NOTE ADULT - SUBJECTIVE AND OBJECTIVE BOX
Patient is a 82y old  Female who presents with a chief complaint of Lethargy (21 Nov 2017 14:56)        SUBJECTIVE / OVERNIGHT EVENTS:  no acute events o/n  pt interactive, unintelligible speech  no apparent distress      MEDICATIONS  (STANDING):  amLODIPine   Tablet 5 milliGRAM(s) Oral daily  calcium carbonate 1250 mG + Vitamin D (OsCal 500 + D) 1 Tablet(s) Oral daily  cholecalciferol 5000 Unit(s) Oral daily  docusate sodium 100 milliGRAM(s) Oral three times a day  donepezil 10 milliGRAM(s) Oral at bedtime  folic acid 1 milliGRAM(s) Oral daily  heparin  Injectable 5000 Unit(s) SubCutaneous every 8 hours  lamoTRIgine  milliGRAM(s) Oral daily  PHENobarbital 64.8 milliGRAM(s) Oral every 12 hours  raloxifene 60 milliGRAM(s) Oral daily  senna 2 Tablet(s) Oral at bedtime  sodium chloride 1 Gram(s) Oral daily    MEDICATIONS  (PRN):      Vital Signs Last 24 Hrs  T(C): 36.4 (24 Nov 2017 05:27), Max: 37 (23 Nov 2017 14:40)  T(F): 97.5 (24 Nov 2017 05:27), Max: 98.6 (23 Nov 2017 14:40)  HR: 68 (24 Nov 2017 05:27) (68 - 81)  BP: 119/75 (24 Nov 2017 05:27) (109/70 - 132/81)  BP(mean): --  RR: 18 (24 Nov 2017 05:27) (18 - 18)  SpO2: 95% (24 Nov 2017 05:27) (95% - 98%)  CAPILLARY BLOOD GLUCOSE        I&O's Summary      General: NAD  HEENT: EOMI, PERRLA  CV: S1S2 RRR no MRG  Lungs: CTA BL  Abdomen: soft NT/ND +BS   Extremities: No CCE  Skin/MSK: No rashes, preserved ROM on active & passive movement  Neuro: AAOx0, no focal deficits, no sensory deficits    LABS:                        12.9   5.20  )-----------( 183      ( 24 Nov 2017 06:04 )             41.8     11-24    134<L>  |  97<L>  |  18  ----------------------------<  83  4.3   |  26  |  0.33<L>    Ca    8.6      24 Nov 2017 06:04                RADIOLOGY & ADDITIONAL TESTS:    Imaging Personally Reviewed:  Consultant(s) Notes Reviewed:    Care Discussed with Consultants/Other Providers:

## 2017-11-24 NOTE — DISCHARGE NOTE ADULT - PATIENT PORTAL LINK FT
“You can access the [employer] Patient Portal, offered by [facility], by registering with the following website: [email or web address]”

## 2017-11-24 NOTE — DISCHARGE NOTE ADULT - SECONDARY DIAGNOSIS.
Toxic metabolic encephalopathy Seizure disorder Dementia Essential hypertension History of breast cancer

## 2017-11-25 PROCEDURE — 99232 SBSQ HOSP IP/OBS MODERATE 35: CPT

## 2017-11-25 RX ADMIN — Medication 100 MILLIGRAM(S): at 05:07

## 2017-11-25 RX ADMIN — RALOXIFENE HYDROCHLORIDE 60 MILLIGRAM(S): 60 TABLET, COATED ORAL at 11:47

## 2017-11-25 RX ADMIN — HEPARIN SODIUM 5000 UNIT(S): 5000 INJECTION INTRAVENOUS; SUBCUTANEOUS at 21:13

## 2017-11-25 RX ADMIN — AMLODIPINE BESYLATE 5 MILLIGRAM(S): 2.5 TABLET ORAL at 05:07

## 2017-11-25 RX ADMIN — Medication 100 MILLIGRAM(S): at 21:19

## 2017-11-25 RX ADMIN — SODIUM CHLORIDE 1 GRAM(S): 9 INJECTION INTRAMUSCULAR; INTRAVENOUS; SUBCUTANEOUS at 11:47

## 2017-11-25 RX ADMIN — Medication 1 TABLET(S): at 11:48

## 2017-11-25 RX ADMIN — Medication 64.8 MILLIGRAM(S): at 17:46

## 2017-11-25 RX ADMIN — Medication 64.8 MILLIGRAM(S): at 05:07

## 2017-11-25 RX ADMIN — DONEPEZIL HYDROCHLORIDE 10 MILLIGRAM(S): 10 TABLET, FILM COATED ORAL at 21:19

## 2017-11-25 RX ADMIN — HEPARIN SODIUM 5000 UNIT(S): 5000 INJECTION INTRAVENOUS; SUBCUTANEOUS at 05:07

## 2017-11-25 RX ADMIN — HEPARIN SODIUM 5000 UNIT(S): 5000 INJECTION INTRAVENOUS; SUBCUTANEOUS at 14:41

## 2017-11-25 RX ADMIN — Medication 1 MILLIGRAM(S): at 11:47

## 2017-11-25 RX ADMIN — Medication 100 MILLIGRAM(S): at 14:41

## 2017-11-25 RX ADMIN — SENNA PLUS 2 TABLET(S): 8.6 TABLET ORAL at 21:19

## 2017-11-25 RX ADMIN — Medication 5000 UNIT(S): at 11:48

## 2017-11-25 NOTE — PROGRESS NOTE ADULT - SUBJECTIVE AND OBJECTIVE BOX
Patient is a 82y old  Female who presents with a chief complaint of Lethargy       patient seen and examine at bed side   no acute issue       MEDICATIONS  (STANDING):  amLODIPine   Tablet 5 milliGRAM(s) Oral daily  calcium carbonate 1250 mG + Vitamin D (OsCal 500 + D) 1 Tablet(s) Oral daily  cefTRIAXone   IVPB 1 Gram(s) IV Intermittent every 24 hours  cholecalciferol 5000 Unit(s) Oral daily  docusate sodium 100 milliGRAM(s) Oral three times a day  donepezil 10 milliGRAM(s) Oral at bedtime  folic acid 1 milliGRAM(s) Oral daily  heparin  Injectable 5000 Unit(s) SubCutaneous every 8 hours  lamoTRIgine  milliGRAM(s) Oral daily  PHENobarbital 64.8 milliGRAM(s) Oral every 12 hours  raloxifene 60 milliGRAM(s) Oral daily  senna 2 Tablet(s) Oral at bedtime  sodium chloride 1 Gram(s) Oral daily    MEDICATIONS  (PRN):    Vital Signs Last 24 Hrs  T(C): 36.7 (2017 12:57), Max: 36.7 (2017 12:57)  T(F): 98 (2017 12:57), Max: 98 (2017 12:57)  HR: 74 (2017 12:57) (73 - 79)  BP: 135/78 (2017 12:57) (118/73 - 153/87)  BP(mean): --  RR: 18 (2017 12:57) (17 - 18)  SpO2: 96% (2017 12:57) (94% - 96%)        General: NAD  HEENT: EOMI, PERRLA  CV: S1S2 RRR no MRG  Lungs: CTA BL  Abdomen: soft NT/ND +BS   Extremities: No CCE  Skin/MSK: No rashes, preserved ROM on active & passive movement  Neuro: AAOx0, no focal deficits, no sensory deficits        LABS:                                     12.9   5.20  )-----------( 183      ( 2017 06:04 )             41.8       11-24    134<L>  |  97<L>  |  18  ----------------------------<  83  4.3   |  26  |  0.33<L>    Ca    8.6      2017 06:04              Culture - Blood (collected 2017 18:08)  Source: BLOOD PERIPHERAL  Preliminary Report (2017 18:08):    NO ORGANISMS ISOLATED    NO ORGANISMS ISOLATED AT 24 HOURS    Culture - Urine (collected 2017 14:02)  Source: URINE CATHETER  Preliminary Report (2017 10:09):    GNRID^Gram Neg Giuseppe To Be Identified    COLONY COUNT: > = 100,000 CFU/ML    Culture - Blood (collected 2017 14:01)  Source: BLOOD VENOUS  Preliminary Report (2017 14:01):    NO ORGANISMS ISOLATED    NO ORGANISMS ISOLATED AT 48 HRS.        Color: YELLOW / Appearance: CLOUDY / S.017 / pH: 6.5  Gluc: NEGATIVE / Ketone: NEGATIVE  / Bili: NEGATIVE / Urobili: NORMAL E.U.   Blood: NEGATIVE / Protein: 10 / Nitrite: POSITIVE   Leuk Esterase: LARGE / RBC: 5-10 / WBC >50   Sq Epi: OCC / Non Sq Epi: x / Bacteria: MANY        RADIOLOGY & ADDITIONAL TESTS:    Imaging Personally Reviewed:  Consultant(s) Notes Reviewed:    Care Discussed with Consultants/Other Providers:

## 2017-11-26 LAB
BACTERIA BLD CULT: SIGNIFICANT CHANGE UP
BACTERIA BLD CULT: SIGNIFICANT CHANGE UP

## 2017-11-26 PROCEDURE — 99232 SBSQ HOSP IP/OBS MODERATE 35: CPT

## 2017-11-26 RX ADMIN — HEPARIN SODIUM 5000 UNIT(S): 5000 INJECTION INTRAVENOUS; SUBCUTANEOUS at 14:12

## 2017-11-26 RX ADMIN — Medication 100 MILLIGRAM(S): at 14:25

## 2017-11-26 RX ADMIN — AMLODIPINE BESYLATE 5 MILLIGRAM(S): 2.5 TABLET ORAL at 05:55

## 2017-11-26 RX ADMIN — DONEPEZIL HYDROCHLORIDE 10 MILLIGRAM(S): 10 TABLET, FILM COATED ORAL at 21:45

## 2017-11-26 RX ADMIN — SODIUM CHLORIDE 1 GRAM(S): 9 INJECTION INTRAMUSCULAR; INTRAVENOUS; SUBCUTANEOUS at 12:48

## 2017-11-26 RX ADMIN — Medication 100 MILLIGRAM(S): at 21:46

## 2017-11-26 RX ADMIN — HEPARIN SODIUM 5000 UNIT(S): 5000 INJECTION INTRAVENOUS; SUBCUTANEOUS at 05:54

## 2017-11-26 RX ADMIN — HEPARIN SODIUM 5000 UNIT(S): 5000 INJECTION INTRAVENOUS; SUBCUTANEOUS at 21:46

## 2017-11-26 RX ADMIN — RALOXIFENE HYDROCHLORIDE 60 MILLIGRAM(S): 60 TABLET, COATED ORAL at 21:45

## 2017-11-26 RX ADMIN — Medication 1 TABLET(S): at 12:47

## 2017-11-26 RX ADMIN — Medication 5000 UNIT(S): at 12:48

## 2017-11-26 RX ADMIN — SENNA PLUS 2 TABLET(S): 8.6 TABLET ORAL at 21:46

## 2017-11-26 RX ADMIN — Medication 64.8 MILLIGRAM(S): at 05:54

## 2017-11-26 RX ADMIN — Medication 64.8 MILLIGRAM(S): at 17:30

## 2017-11-26 RX ADMIN — Medication 100 MILLIGRAM(S): at 05:55

## 2017-11-26 RX ADMIN — Medication 1 MILLIGRAM(S): at 12:48

## 2017-11-26 NOTE — PROGRESS NOTE ADULT - SUBJECTIVE AND OBJECTIVE BOX
Patient is a 82y old  Female who presents with a chief complaint of Lethargy       patient seen and examine at bed side   no acute issue       MEDICATIONS  (STANDING):  amLODIPine   Tablet 5 milliGRAM(s) Oral daily  calcium carbonate 1250 mG + Vitamin D (OsCal 500 + D) 1 Tablet(s) Oral daily  cholecalciferol 5000 Unit(s) Oral daily  docusate sodium 100 milliGRAM(s) Oral three times a day  donepezil 10 milliGRAM(s) Oral at bedtime  folic acid 1 milliGRAM(s) Oral daily  heparin  Injectable 5000 Unit(s) SubCutaneous every 8 hours  lamoTRIgine ER (laMICtal XR) 600 milliGRAM(s) 600 milliGRAM(s) Oral daily  PHENobarbital 64.8 milliGRAM(s) Oral every 12 hours  raloxifene 60 milliGRAM(s) Oral daily  senna 2 Tablet(s) Oral at bedtime  sodium chloride 1 Gram(s) Oral daily    MEDICATIONS  (PRN):      Vital Signs Last 24 Hrs  T(C): 37.1 (2017 05:53), Max: 37.1 (2017 21:02)  T(F): 98.8 (2017 05:53), Max: 98.8 (2017 05:53)  HR: 70 (2017 05:53) (65 - 70)  BP: 133/74 (2017 05:53) (114/70 - 133/74)  BP(mean): --  RR: 18 (2017 05:53) (18 - 18)  SpO2: 99% (2017 05:53) (98% - 99%)        General: NAD  HEENT: EOMI, PERRLA  CV: S1S2 RRR no MRG  Lungs: CTA BL  Abdomen: soft NT/ND +BS   Extremities: No CCE  Skin/MSK: No rashes, preserved ROM on active & passive movement  Neuro: AAOx0, no focal deficits, no sensory deficits        LABS:                           no labs              Culture - Blood (collected 2017 18:08)  Source: BLOOD PERIPHERAL  Preliminary Report (2017 18:08):    NO ORGANISMS ISOLATED    NO ORGANISMS ISOLATED AT 24 HOURS    Culture - Urine (collected 2017 14:02)  Source: URINE CATHETER  Preliminary Report (2017 10:09):    GNRID^Gram Neg Giuseppe To Be Identified    COLONY COUNT: > = 100,000 CFU/ML    Culture - Blood (collected 2017 14:01)  Source: BLOOD VENOUS  Preliminary Report (2017 14:01):    NO ORGANISMS ISOLATED    NO ORGANISMS ISOLATED AT 48 HRS.        Color: YELLOW / Appearance: CLOUDY / S.017 / pH: 6.5  Gluc: NEGATIVE / Ketone: NEGATIVE  / Bili: NEGATIVE / Urobili: NORMAL E.U.   Blood: NEGATIVE / Protein: 10 / Nitrite: POSITIVE   Leuk Esterase: LARGE / RBC: 5-10 / WBC >50   Sq Epi: OCC / Non Sq Epi: x / Bacteria: MANY        RADIOLOGY & ADDITIONAL TESTS:    Imaging Personally Reviewed:  Consultant(s) Notes Reviewed:    Care Discussed with Consultants/Other Providers:

## 2017-11-27 VITALS
SYSTOLIC BLOOD PRESSURE: 135 MMHG | DIASTOLIC BLOOD PRESSURE: 86 MMHG | OXYGEN SATURATION: 98 % | RESPIRATION RATE: 18 BRPM | HEART RATE: 75 BPM | TEMPERATURE: 97 F

## 2017-11-27 DIAGNOSIS — F03.90 UNSPECIFIED DEMENTIA WITHOUT BEHAVIORAL DISTURBANCE: ICD-10-CM

## 2017-11-27 DIAGNOSIS — N39.0 URINARY TRACT INFECTION, SITE NOT SPECIFIED: ICD-10-CM

## 2017-11-27 PROCEDURE — 99239 HOSP IP/OBS DSCHRG MGMT >30: CPT

## 2017-11-27 RX ORDER — DONEPEZIL HYDROCHLORIDE 10 MG/1
1 TABLET, FILM COATED ORAL
Qty: 0 | Refills: 0 | DISCHARGE
Start: 2017-11-27

## 2017-11-27 RX ORDER — RALOXIFENE HYDROCHLORIDE 60 MG/1
1 TABLET, COATED ORAL
Qty: 0 | Refills: 0 | DISCHARGE
Start: 2017-11-27

## 2017-11-27 RX ORDER — SENNA PLUS 8.6 MG/1
2 TABLET ORAL
Qty: 0 | Refills: 0 | COMMUNITY

## 2017-11-27 RX ORDER — AMLODIPINE BESYLATE 2.5 MG/1
1 TABLET ORAL
Qty: 0 | Refills: 0 | COMMUNITY

## 2017-11-27 RX ORDER — FOLIC ACID 0.8 MG
1 TABLET ORAL
Qty: 0 | Refills: 0 | DISCHARGE
Start: 2017-11-27

## 2017-11-27 RX ORDER — PHENOBARBITAL 60 MG
1 TABLET ORAL
Qty: 0 | Refills: 0 | DISCHARGE
Start: 2017-11-27

## 2017-11-27 RX ORDER — CHOLECALCIFEROL (VITAMIN D3) 125 MCG
1 CAPSULE ORAL
Qty: 0 | Refills: 0 | COMMUNITY

## 2017-11-27 RX ORDER — PHENOBARBITAL 60 MG
1 TABLET ORAL
Qty: 0 | Refills: 0 | COMMUNITY

## 2017-11-27 RX ORDER — DOCUSATE SODIUM 100 MG
1 CAPSULE ORAL
Qty: 0 | Refills: 0 | COMMUNITY

## 2017-11-27 RX ORDER — SODIUM CHLORIDE 9 MG/ML
1 INJECTION INTRAMUSCULAR; INTRAVENOUS; SUBCUTANEOUS
Qty: 0 | Refills: 0 | COMMUNITY

## 2017-11-27 RX ORDER — FOLIC ACID 0.8 MG
1 TABLET ORAL
Qty: 0 | Refills: 0 | COMMUNITY

## 2017-11-27 RX ORDER — CHOLECALCIFEROL (VITAMIN D3) 125 MCG
5000 CAPSULE ORAL
Qty: 0 | Refills: 0 | DISCHARGE
Start: 2017-11-27

## 2017-11-27 RX ORDER — SODIUM CHLORIDE 9 MG/ML
1 INJECTION INTRAMUSCULAR; INTRAVENOUS; SUBCUTANEOUS
Qty: 0 | Refills: 0 | DISCHARGE
Start: 2017-11-27

## 2017-11-27 RX ORDER — AMLODIPINE BESYLATE 2.5 MG/1
1 TABLET ORAL
Qty: 0 | Refills: 0 | DISCHARGE
Start: 2017-11-27

## 2017-11-27 RX ORDER — RALOXIFENE HYDROCHLORIDE 60 MG/1
1 TABLET, COATED ORAL
Qty: 0 | Refills: 0 | COMMUNITY

## 2017-11-27 RX ORDER — DOCUSATE SODIUM 100 MG
1 CAPSULE ORAL
Qty: 0 | Refills: 0 | DISCHARGE
Start: 2017-11-27

## 2017-11-27 RX ORDER — SENNA PLUS 8.6 MG/1
2 TABLET ORAL
Qty: 0 | Refills: 0 | DISCHARGE
Start: 2017-11-27

## 2017-11-27 RX ORDER — DONEPEZIL HYDROCHLORIDE 10 MG/1
1 TABLET, FILM COATED ORAL
Qty: 0 | Refills: 0 | COMMUNITY

## 2017-11-27 RX ORDER — LAMOTRIGINE 25 MG/1
3 TABLET, ORALLY DISINTEGRATING ORAL
Qty: 0 | Refills: 0 | COMMUNITY

## 2017-11-27 RX ADMIN — RALOXIFENE HYDROCHLORIDE 60 MILLIGRAM(S): 60 TABLET, COATED ORAL at 11:55

## 2017-11-27 RX ADMIN — Medication 5000 UNIT(S): at 11:55

## 2017-11-27 RX ADMIN — HEPARIN SODIUM 5000 UNIT(S): 5000 INJECTION INTRAVENOUS; SUBCUTANEOUS at 06:22

## 2017-11-27 RX ADMIN — Medication 100 MILLIGRAM(S): at 11:54

## 2017-11-27 RX ADMIN — SODIUM CHLORIDE 1 GRAM(S): 9 INJECTION INTRAMUSCULAR; INTRAVENOUS; SUBCUTANEOUS at 11:54

## 2017-11-27 RX ADMIN — HEPARIN SODIUM 5000 UNIT(S): 5000 INJECTION INTRAVENOUS; SUBCUTANEOUS at 11:55

## 2017-11-27 RX ADMIN — Medication 1 MILLIGRAM(S): at 11:54

## 2017-11-27 RX ADMIN — AMLODIPINE BESYLATE 5 MILLIGRAM(S): 2.5 TABLET ORAL at 06:22

## 2017-11-27 RX ADMIN — Medication 1 TABLET(S): at 11:54

## 2017-11-27 RX ADMIN — Medication 100 MILLIGRAM(S): at 06:21

## 2017-11-27 RX ADMIN — Medication 64.8 MILLIGRAM(S): at 06:21

## 2017-11-27 NOTE — PROGRESS NOTE ADULT - SUBJECTIVE AND OBJECTIVE BOX
Patient is a 82y old  Female who presents with a chief complaint of Lethargy (24 Nov 2017 14:29)      SUBJECTIVE / OVERNIGHT EVENTS:    MEDICATIONS  (STANDING):  amLODIPine   Tablet 5 milliGRAM(s) Oral daily  calcium carbonate 1250 mG + Vitamin D (OsCal 500 + D) 1 Tablet(s) Oral daily  cholecalciferol 5000 Unit(s) Oral daily  docusate sodium 100 milliGRAM(s) Oral three times a day  donepezil 10 milliGRAM(s) Oral at bedtime  folic acid 1 milliGRAM(s) Oral daily  heparin  Injectable 5000 Unit(s) SubCutaneous every 8 hours  lamoTRIgine ER (laMICtal XR) 600 milliGRAM(s) 600 milliGRAM(s) Oral daily  PHENobarbital 64.8 milliGRAM(s) Oral every 12 hours  raloxifene 60 milliGRAM(s) Oral daily  senna 2 Tablet(s) Oral at bedtime  sodium chloride 1 Gram(s) Oral daily    MEDICATIONS  (PRN):      Vital Signs Last 24 Hrs  T(C): 36.5 (27 Nov 2017 06:13), Max: 36.7 (26 Nov 2017 20:25)  T(F): 97.7 (27 Nov 2017 06:13), Max: 98.1 (26 Nov 2017 20:25)  HR: 61 (27 Nov 2017 06:13) (61 - 63)  BP: 144/83 (27 Nov 2017 06:13) (141/77 - 144/83)  BP(mean): --  RR: 18 (27 Nov 2017 06:13) (18 - 18)  SpO2: 96% (27 Nov 2017 06:13) (96% - 98%)  CAPILLARY BLOOD GLUCOSE        I&O's Summary      PHYSICAL EXAM:  GENERAL: NAD, well-developed  HEAD:  Atraumatic, Normocephalic  EYES: EOMI, PERRLA, conjunctiva and sclera clear  NECK: Supple, No JVD  CHEST/LUNG: Clear to auscultation bilaterally; No wheeze  HEART: Regular rate and rhythm; No murmurs, rubs, or gallops  ABDOMEN: Soft, Nontender, Nondistended; Bowel sounds present  EXTREMITIES:  2+ Peripheral Pulses, No clubbing, cyanosis, or edema  PSYCH: AAOx3  NEUROLOGY: non-focal  SKIN: No rashes or lesions    LABS:                    RADIOLOGY & ADDITIONAL TESTS:    Imaging Personally Reviewed:    Consultant(s) Notes Reviewed:      Care Discussed with Consultants/Other Providers: Patient is a 82y old  Female who presents with a chief complaint of Lethargy (24 Nov 2017 14:29)      SUBJECTIVE / OVERNIGHT EVENTS:  pt alert and appears comfortable.     MEDICATIONS  (STANDING):  amLODIPine   Tablet 5 milliGRAM(s) Oral daily  calcium carbonate 1250 mG + Vitamin D (OsCal 500 + D) 1 Tablet(s) Oral daily  cholecalciferol 5000 Unit(s) Oral daily  docusate sodium 100 milliGRAM(s) Oral three times a day  donepezil 10 milliGRAM(s) Oral at bedtime  folic acid 1 milliGRAM(s) Oral daily  heparin  Injectable 5000 Unit(s) SubCutaneous every 8 hours  lamoTRIgine ER (laMICtal XR) 600 milliGRAM(s) 600 milliGRAM(s) Oral daily  PHENobarbital 64.8 milliGRAM(s) Oral every 12 hours  raloxifene 60 milliGRAM(s) Oral daily  senna 2 Tablet(s) Oral at bedtime  sodium chloride 1 Gram(s) Oral daily    MEDICATIONS  (PRN):    Vital Signs Last 24 Hrs  T(C): 36.5 (27 Nov 2017 06:13), Max: 36.7 (26 Nov 2017 20:25)  T(F): 97.7 (27 Nov 2017 06:13), Max: 98.1 (26 Nov 2017 20:25)  HR: 61 (27 Nov 2017 06:13) (61 - 63)  BP: 144/83 (27 Nov 2017 06:13) (141/77 - 144/83)  BP(mean): --  RR: 18 (27 Nov 2017 06:13) (18 - 18)  SpO2: 96% (27 Nov 2017 06:13) (96% - 98%)  CAPILLARY BLOOD GLUCOSE    I&O's Summary    PHYSICAL EXAM:  General: NAD  HEENT: EOMI, PERRLA  CV: S1S2 RRR no MRG  Lungs: CTA BL  Abdomen: soft NT/ND +BS   Extremities: No CCE  Skin/MSK: No rashes, preserved ROM on active & passive movement  Neuro: Alert and arousable, no focal deficits, no sensory deficits    LABS:  No labs

## 2017-11-27 NOTE — PROGRESS NOTE ADULT - ASSESSMENT
82F hx of Intellectual disability, Seizure disorder, Schizophrenia, Osteoporosis, Breast CA s/p Right Mastectomy, Peripheral Neuropathy presents to [facility] from group home for lethargy.  Found to have urinary tract infection      1. metabolic encephalopathy due to UTI .  continue abx   cx grew gm- rods  monitor labs   f/u sensitivity     2. UTI (urinary tract infection). continue abx   klebseilla ; pan sensitive       3.Seizure disorder.     c/w Phenobarbital and Lamictal at scheduled doses for home, not actively seizing  CT Head negative on initial triage for acute intracranial hemorrhage or masses.     4. Essential hypertension.    c/w Amlodipine for HTN,   5.History of breast cancer.    s/p R mastectomy. C/w Raloxifene.     6. Dementia. Plan: c/w Donepezil.    dc to group home
82F hx of Intellectual disability, Seizure disorder, Schizophrenia, Osteoporosis, Breast CA s/p Right Mastectomy, Peripheral Neuropathy presents to [facility] from group home for lethargy.  Found to have urinary tract infection
82F hx of Intellectual disability, Seizure disorder, Schizophrenia, Osteoporosis, Breast CA s/p Right Mastectomy, Peripheral Neuropathy presents to [facility] from group home for lethargy.  Found to have urinary tract infection      1. metabolic encephalopathy due to UTI .  continue abx   cx grew gm- rods  monitor labs   f/u sensitivity     2. UTI (urinary tract infection). continue abx   f/u cx    3.Seizure disorder.     c/w Phenobarbital and Lamictal at scheduled doses for home, not actively seizing  CT Head negative on initial triage for acute intracranial hemorrhage or masses.     4. Essential hypertension.    c/w Amlodipine for HTN,   5.History of breast cancer.    s/p R mastectomy. C/w Raloxifene.     6. Dementia. Plan: c/w Donepezil.
82F hx of Intellectual disability, Seizure disorder, Schizophrenia, Osteoporosis, Breast CA s/p Right Mastectomy, Peripheral Neuropathy presents to [facility] from group home for lethargy.  Found to have urinary tract infection      1. metabolic encephalopathy due to UTI .  continue abx   cx grew gm- rods  monitor labs   f/u sensitivity     2. UTI (urinary tract infection). continue abx   klebseilla ; pan sensitive       3.Seizure disorder.     c/w Phenobarbital and Lamictal at scheduled doses for home, not actively seizing  CT Head negative on initial triage for acute intracranial hemorrhage or masses.     4. Essential hypertension.    c/w Amlodipine for HTN,   5.History of breast cancer.    s/p R mastectomy. C/w Raloxifene.     6. Dementia. Plan: c/w Donepezil.    dc to group home on monday
82F hx of Intellectual disability, Seizure disorder, Schizophrenia, Osteoporosis, Breast CA s/p Right Mastectomy, Peripheral Neuropathy presents to [facility] from group home for lethargy.  Found to have urinary tract infection
82F hx of Intellectual disability, Seizure disorder, Schizophrenia, Osteoporosis, Breast CA s/p Right Mastectomy, Peripheral Neuropathy presents to [facility] from group home for lethargy.  Found to have urinary tract infection

## 2017-11-27 NOTE — PROGRESS NOTE ADULT - PROBLEM SELECTOR PLAN 3
continue with amlodipine
c/w Phenobarbital and Lamictal   CT Head negative
c/w Phenobarbital and Lamictal at scheduled doses for home, not actively seizing  CT Head negative on initial triage for acute intracranial hemorrhage or masses

## 2017-11-27 NOTE — PROGRESS NOTE ADULT - PROBLEM SELECTOR PLAN 2
continue with home regimen of phenobarbital and lamictal
ucx - pansensitive Klebsiella  completed 3 days of CTX  blood cultures NGTD
c/w CTX  f/u Ucx , blood cultures

## 2017-11-27 NOTE — PROGRESS NOTE ADULT - PROBLEM SELECTOR PLAN 1
Likely due to UTI  mental status back to baseline w/ abx treatment
Likely due to UTI  No aide at bedside presently, but per HPI yesterday HHA at bedside noted pt back to baseline after abx  c/w CTX  f/u urine Cx  f/u blood cxs
- Ucx with kleb pna  - s/p 3 days of ceftriaxone

## 2017-11-27 NOTE — PROGRESS NOTE ADULT - PROBLEM SELECTOR PROBLEM 1
Toxic metabolic encephalopathy
Toxic metabolic encephalopathy
Urinary tract infection without hematuria, site unspecified

## 2017-11-27 NOTE — PROGRESS NOTE ADULT - PROBLEM SELECTOR PROBLEM 4
Dementia without behavioral disturbance, unspecified dementia type
Essential hypertension
Essential hypertension

## 2019-03-20 NOTE — ED PROVIDER NOTE - BREATH SOUNDS
Expected Date Of Service: 01/31/2019 Billing Type: Third-Party Bill Bill For Surgical Tray: no rt base/RALES

## 2019-05-10 ENCOUNTER — EMERGENCY (EMERGENCY)
Facility: HOSPITAL | Age: 84
LOS: 0 days | Discharge: HOME | End: 2019-05-10
Attending: EMERGENCY MEDICINE
Payer: MEDICARE

## 2019-05-10 VITALS
WEIGHT: 139.99 LBS | TEMPERATURE: 99 F | HEART RATE: 85 BPM | DIASTOLIC BLOOD PRESSURE: 91 MMHG | RESPIRATION RATE: 18 BRPM | SYSTOLIC BLOOD PRESSURE: 143 MMHG | OXYGEN SATURATION: 94 %

## 2019-05-10 DIAGNOSIS — M81.0 AGE-RELATED OSTEOPOROSIS WITHOUT CURRENT PATHOLOGICAL FRACTURE: ICD-10-CM

## 2019-05-10 DIAGNOSIS — S09.90XA UNSPECIFIED INJURY OF HEAD, INITIAL ENCOUNTER: ICD-10-CM
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
## 2019-05-10 DIAGNOSIS — Z84.89 FAMILY HISTORY OF OTHER SPECIFIED CONDITIONS: Chronic | ICD-10-CM

## 2019-05-10 DIAGNOSIS — F79 UNSPECIFIED INTELLECTUAL DISABILITIES: ICD-10-CM
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
## 2019-05-10 DIAGNOSIS — G40.909 EPILEPSY, UNSPECIFIED, NOT INTRACTABLE, WITHOUT STATUS EPILEPTICUS: ICD-10-CM

## 2019-05-10 DIAGNOSIS — F20.9 SCHIZOPHRENIA, UNSPECIFIED: ICD-10-CM

## 2019-05-10 DIAGNOSIS — Z90.11 ACQUIRED ABSENCE OF RIGHT BREAST AND NIPPLE: Chronic | ICD-10-CM

## 2019-05-10 PROCEDURE — 70450 CT HEAD/BRAIN W/O DYE: CPT | Mod: 26

## 2019-05-10 PROCEDURE — 99284 EMERGENCY DEPT VISIT MOD MDM: CPT

## 2019-05-10 PROCEDURE — 72125 CT NECK SPINE W/O DYE: CPT | Mod: 26

## 2019-05-10 RX ADMIN — Medication 1 MILLIGRAM(S): at 15:59

## 2019-05-10 NOTE — ED PROVIDER NOTE - PHYSICAL EXAMINATION
Gen: Alert, Well appearing. NAD    Head: NC, AT, PERRL, EOMI, normal lids/conjunctiva   ENT:, patent oropharynx without erythema/exudate, uvula midline  Neck: supple, no tenderness/meningismus  Pulm: Bilateral clear BS, normal resp effort, no wheeze/stridor/retractions  CV: RRR, no M/R/G, +dist pulses   Abd: soft, NT/ND, +BS, no guarding/rebound tenderness  Mskel: no edema/erythema/cyanosis   Skin: no rash   Neuro: Alert, moving all limbs spontaneously

## 2019-05-10 NOTE — ED ADULT NURSE NOTE - NSSUSCREENINGQ5_ED_ALL_ED
Subjective:      Patient ID: Anna Guzman is a 81 y.o. female. HPI  Chief Complaint   Patient presents with    Facial Swelling     eyes swollen     Swelling     both feet and ankle      Chief complaint present illness: 68-year-old white female presents unaccompanied with a one-week history of initially itching watery eyes with some associated light sensitivity and swelling underneath the eyes. She was seen by Dr. Jimmy Carter ophthalmologist on 4/27/2019 and evaluated and diagnosed with an allergic conjunctivitis for which she is now on prednisone eyedrops. However her symptoms have continued. She also has some associated sneezing. There is been no change in medications. She has noticed swelling in lower extremities at the end of the day not very bad when she first awakens. Denies chest pain, shortness of breath etc.  Patient has been off diuretic for multiple months due to hyponatremia. Her sodium is now decent. Her blood pressure today is controlled. Patient does not have a previous history of significant seasonal allergies. Review of Systems   Constitutional: Negative for activity change and fever. HENT: Positive for facial swelling (below the eyes on the cheeks). Negative for congestion, mouth sores, rhinorrhea and sore throat. Eyes: Positive for photophobia, discharge, redness and itching. Respiratory: Negative. Cardiovascular: Positive for leg swelling. Negative for chest pain. Genitourinary: Negative. Allergic/Immunologic:        No prior hx     background/entire past medical,social and family history obtained and reviewed/updated today   Objective:   Physical Exam   Constitutional: She appears well-developed and well-nourished. She appears distressed. HENT:   Head: Normocephalic. Nose: Nose normal.   Mouth/Throat: Oropharynx is clear and moist.   Eyes: Pupils are equal, round, and reactive to light. EOM are normal.   I'll conjunctival injection bilaterally.   No photosensitivity. Patient does have prominent bagginess under each eye. Upper eyelids unremarkable. Neck: Neck supple. Cardiovascular: Normal rate and regular rhythm. Pulmonary/Chest: Effort normal and breath sounds normal. She has no wheezes. She has no rales. Musculoskeletal: She exhibits edema (this is later in the day-trace pretibial edema right; 1+ left. ). She exhibits no tenderness. Lymphadenopathy:        Head (right side): No preauricular adenopathy present. Head (left side): No preauricular adenopathy present. Neurological: She is alert. Skin: Skin is warm. No rash noted. Nursing note and vitals reviewed. /60   Pulse 83   Temp 97.6 °F (36.4 °C) (Oral)   Wt 172 lb (78 kg)   SpO2 98%   BMI 28.62 kg/m²     Assessment:      Lui Krishnamurthy was seen today for facial swelling and swelling. Diagnoses and all orders for this visit:    Acute conjunctivitis of both eyes, unspecified acute conjunctivitis type  -     Renal Function Panel  -     cetirizine (ZYRTEC) 10 MG tablet; Take 1 tablet by mouth daily           Plan:      No follow-ups on file.   Patient Instructions   We may add a water pill once the renal panel comes back           Maura Persons, MA No

## 2019-05-10 NOTE — ED PROVIDER NOTE - CLINICAL SUMMARY MEDICAL DECISION MAKING FREE TEXT BOX
pt nontoxic, CT scan demonstrates no acute pathology. Discussed results and outcome of testing with the patient.  Patient given copy of available results. Patient advised to please follow up with their PMD within the next 24 hours and return to the Emergency Department for worsening symptoms or any other concerns.

## 2019-05-10 NOTE — ED ADULT TRIAGE NOTE - CHIEF COMPLAINT QUOTE
as per EMTS states that she slid out of her wheelchair, did not strike her head, SNF requesting medical evaluations, no non verbal cues of pain

## 2019-05-10 NOTE — ED ADULT NURSE NOTE - OBJECTIVE STATEMENT
pt BIBA with c/o slipping from a chair to the floor denies head trauma as per caregiver, non verbal cues of pain negative

## 2019-05-10 NOTE — ED ADULT NURSE NOTE - NSIMPLEMENTINTERV_GEN_ALL_ED
Implemented All Universal Safety Interventions:  Towner to call system. Call bell, personal items and telephone within reach. Instruct patient to call for assistance. Room bathroom lighting operational. Non-slip footwear when patient is off stretcher. Physically safe environment: no spills, clutter or unnecessary equipment. Stretcher in lowest position, wheels locked, appropriate side rails in place.

## 2019-05-10 NOTE — ED PROVIDER NOTE - OBJECTIVE STATEMENT
82yo femal from group home with [facility] Intellectual disability, Seizure disorder, Schizophrenia, Osteoporosis, Breast CA s/p Right Mastectomy, Peripheral Neuropathy presents for evaluation. Per aid, pt opened up seatbelt on wheelchair and slid down onto buttock but was found laying on ground so brought in for CT. Pt otherwise at baseline mental status.    no vomiting, apparent pain.

## 2019-05-12 PROBLEM — G62.9 POLYNEUROPATHY, UNSPECIFIED: Chronic | Status: ACTIVE | Noted: 2017-11-21

## 2019-05-12 PROBLEM — F03.90 UNSPECIFIED DEMENTIA WITHOUT BEHAVIORAL DISTURBANCE: Chronic | Status: ACTIVE | Noted: 2017-11-21

## 2019-05-12 PROBLEM — F20.9 SCHIZOPHRENIA, UNSPECIFIED: Chronic | Status: ACTIVE | Noted: 2017-11-21

## 2019-05-12 PROBLEM — H91.90 UNSPECIFIED HEARING LOSS, UNSPECIFIED EAR: Chronic | Status: ACTIVE | Noted: 2017-11-21

## 2021-11-30 ENCOUNTER — INPATIENT (INPATIENT)
Facility: HOSPITAL | Age: 86
LOS: 5 days | Discharge: HOME | DRG: 100 | End: 2021-12-06
Attending: STUDENT IN AN ORGANIZED HEALTH CARE EDUCATION/TRAINING PROGRAM | Admitting: STUDENT IN AN ORGANIZED HEALTH CARE EDUCATION/TRAINING PROGRAM
Payer: MEDICARE

## 2021-11-30 VITALS
HEART RATE: 90 BPM | RESPIRATION RATE: 22 BRPM | DIASTOLIC BLOOD PRESSURE: 69 MMHG | SYSTOLIC BLOOD PRESSURE: 135 MMHG | OXYGEN SATURATION: 95 % | HEIGHT: 63 IN | WEIGHT: 89.95 LBS

## 2021-11-30 DIAGNOSIS — R56.9 UNSPECIFIED CONVULSIONS: ICD-10-CM

## 2021-11-30 DIAGNOSIS — Z29.9 ENCOUNTER FOR PROPHYLACTIC MEASURES, UNSPECIFIED: ICD-10-CM

## 2021-11-30 DIAGNOSIS — Z84.89 FAMILY HISTORY OF OTHER SPECIFIED CONDITIONS: Chronic | ICD-10-CM

## 2021-11-30 DIAGNOSIS — R77.8 OTHER SPECIFIED ABNORMALITIES OF PLASMA PROTEINS: ICD-10-CM

## 2021-11-30 DIAGNOSIS — E87.1 HYPO-OSMOLALITY AND HYPONATREMIA: ICD-10-CM

## 2021-11-30 DIAGNOSIS — I10 ESSENTIAL (PRIMARY) HYPERTENSION: ICD-10-CM

## 2021-11-30 DIAGNOSIS — N39.0 URINARY TRACT INFECTION, SITE NOT SPECIFIED: ICD-10-CM

## 2021-11-30 DIAGNOSIS — Z90.11 ACQUIRED ABSENCE OF RIGHT BREAST AND NIPPLE: Chronic | ICD-10-CM

## 2021-11-30 LAB
ACETONE SERPL-MCNC: NEGATIVE — SIGNIFICANT CHANGE UP
ALBUMIN SERPL ELPH-MCNC: 3.8 G/DL — SIGNIFICANT CHANGE UP (ref 3.5–5)
ALP SERPL-CCNC: 110 U/L — SIGNIFICANT CHANGE UP (ref 40–120)
ALT FLD-CCNC: 24 U/L DA — SIGNIFICANT CHANGE UP (ref 10–60)
ANION GAP SERPL CALC-SCNC: 6 MMOL/L — SIGNIFICANT CHANGE UP (ref 5–17)
APPEARANCE UR: ABNORMAL
AST SERPL-CCNC: 45 U/L — HIGH (ref 10–40)
BACTERIA # UR AUTO: ABNORMAL /HPF
BASE EXCESS BLDA CALC-SCNC: 0.8 MMOL/L — SIGNIFICANT CHANGE UP (ref -2–3)
BASOPHILS # BLD AUTO: 0.02 K/UL — SIGNIFICANT CHANGE UP (ref 0–0.2)
BASOPHILS NFR BLD AUTO: 0.2 % — SIGNIFICANT CHANGE UP (ref 0–2)
BILIRUB SERPL-MCNC: 0.4 MG/DL — SIGNIFICANT CHANGE UP (ref 0.2–1.2)
BILIRUB UR-MCNC: NEGATIVE — SIGNIFICANT CHANGE UP
BLOOD GAS COMMENTS ARTERIAL: SIGNIFICANT CHANGE UP
BUN SERPL-MCNC: 17 MG/DL — SIGNIFICANT CHANGE UP (ref 7–18)
CALCIUM SERPL-MCNC: 8.8 MG/DL — SIGNIFICANT CHANGE UP (ref 8.4–10.5)
CHLORIDE SERPL-SCNC: 97 MMOL/L — SIGNIFICANT CHANGE UP (ref 96–108)
CK SERPL-CCNC: 135 U/L — SIGNIFICANT CHANGE UP (ref 21–215)
CO2 SERPL-SCNC: 26 MMOL/L — SIGNIFICANT CHANGE UP (ref 22–31)
COLOR SPEC: YELLOW — SIGNIFICANT CHANGE UP
CREAT SERPL-MCNC: 0.58 MG/DL — SIGNIFICANT CHANGE UP (ref 0.5–1.3)
DIFF PNL FLD: ABNORMAL
EOSINOPHIL # BLD AUTO: 0 K/UL — SIGNIFICANT CHANGE UP (ref 0–0.5)
EOSINOPHIL NFR BLD AUTO: 0 % — SIGNIFICANT CHANGE UP (ref 0–6)
EPI CELLS # UR: ABNORMAL /HPF
GLUCOSE SERPL-MCNC: 141 MG/DL — HIGH (ref 70–99)
GLUCOSE UR QL: 50 MG/DL
HCO3 BLDA-SCNC: 27 MMOL/L — SIGNIFICANT CHANGE UP (ref 21–28)
HCT VFR BLD CALC: 39.9 % — SIGNIFICANT CHANGE UP (ref 34.5–45)
HCT VFR BLD CALC: 46.2 % — HIGH (ref 34.5–45)
HGB BLD-MCNC: 12.2 G/DL — SIGNIFICANT CHANGE UP (ref 11.5–15.5)
HGB BLD-MCNC: 14.5 G/DL — SIGNIFICANT CHANGE UP (ref 11.5–15.5)
HYALINE CASTS # UR AUTO: ABNORMAL /LPF
IMM GRANULOCYTES NFR BLD AUTO: 0.2 % — SIGNIFICANT CHANGE UP (ref 0–1.5)
KETONES UR-MCNC: NEGATIVE — SIGNIFICANT CHANGE UP
LACTATE SERPL-SCNC: 3.3 MMOL/L — HIGH (ref 0.7–2)
LEUKOCYTE ESTERASE UR-ACNC: ABNORMAL
LYMPHOCYTES # BLD AUTO: 0.48 K/UL — LOW (ref 1–3.3)
LYMPHOCYTES # BLD AUTO: 4.3 % — LOW (ref 13–44)
MAGNESIUM SERPL-MCNC: 2.3 MG/DL — SIGNIFICANT CHANGE UP (ref 1.6–2.6)
MANUAL SMEAR VERIFICATION: SIGNIFICANT CHANGE UP
MCHC RBC-ENTMCNC: 22.3 PG — LOW (ref 27–34)
MCHC RBC-ENTMCNC: 22.5 PG — LOW (ref 27–34)
MCHC RBC-ENTMCNC: 30.6 GM/DL — LOW (ref 32–36)
MCHC RBC-ENTMCNC: 31.4 GM/DL — LOW (ref 32–36)
MCV RBC AUTO: 71 FL — LOW (ref 80–100)
MCV RBC AUTO: 73.6 FL — LOW (ref 80–100)
MICROCYTES BLD QL: SLIGHT — SIGNIFICANT CHANGE UP
MONOCYTES # BLD AUTO: 0.18 K/UL — SIGNIFICANT CHANGE UP (ref 0–0.9)
MONOCYTES NFR BLD AUTO: 1.6 % — LOW (ref 2–14)
NEUTROPHILS # BLD AUTO: 10.36 K/UL — HIGH (ref 1.8–7.4)
NEUTROPHILS NFR BLD AUTO: 93.7 % — HIGH (ref 43–77)
NITRITE UR-MCNC: POSITIVE
NRBC # BLD: 0 /100 WBCS — SIGNIFICANT CHANGE UP (ref 0–0)
NRBC # BLD: 0 /100 WBCS — SIGNIFICANT CHANGE UP (ref 0–0)
NT-PROBNP SERPL-SCNC: 214 PG/ML — SIGNIFICANT CHANGE UP (ref 0–450)
OSMOLALITY SERPL: 283 MOSMOL/KG — SIGNIFICANT CHANGE UP (ref 280–301)
PCO2 BLDA: 46 MMHG — HIGH (ref 32–35)
PH BLDA: 7.37 — SIGNIFICANT CHANGE UP (ref 7.35–7.45)
PH UR: 5 — SIGNIFICANT CHANGE UP (ref 5–8)
PHOSPHATE SERPL-MCNC: 3.6 MG/DL — SIGNIFICANT CHANGE UP (ref 2.5–4.5)
PLAT MORPH BLD: NORMAL — SIGNIFICANT CHANGE UP
PLATELET # BLD AUTO: 224 K/UL — SIGNIFICANT CHANGE UP (ref 150–400)
PLATELET # BLD AUTO: 267 K/UL — SIGNIFICANT CHANGE UP (ref 150–400)
PLATELET COUNT - ESTIMATE: NORMAL — SIGNIFICANT CHANGE UP
PO2 BLDA: 275 MMHG — HIGH (ref 83–108)
POTASSIUM SERPL-MCNC: 5.6 MMOL/L — HIGH (ref 3.5–5.3)
POTASSIUM SERPL-SCNC: 5.6 MMOL/L — HIGH (ref 3.5–5.3)
PROT SERPL-MCNC: 9.2 G/DL — HIGH (ref 6–8.3)
PROT UR-MCNC: 100
RAPID RVP RESULT: SIGNIFICANT CHANGE UP
RBC # BLD: 5.42 M/UL — HIGH (ref 3.8–5.2)
RBC # BLD: 6.51 M/UL — HIGH (ref 3.8–5.2)
RBC # FLD: 14.2 % — SIGNIFICANT CHANGE UP (ref 10.3–14.5)
RBC # FLD: 14.2 % — SIGNIFICANT CHANGE UP (ref 10.3–14.5)
RBC BLD AUTO: ABNORMAL
RBC CASTS # UR COMP ASSIST: ABNORMAL /HPF (ref 0–2)
SAO2 % BLDA: 100 % — SIGNIFICANT CHANGE UP
SARS-COV-2 RNA SPEC QL NAA+PROBE: SIGNIFICANT CHANGE UP
SODIUM SERPL-SCNC: 129 MMOL/L — LOW (ref 135–145)
SODIUM UR-SCNC: 106 MMOL/L — SIGNIFICANT CHANGE UP
SP GR SPEC: 1.02 — SIGNIFICANT CHANGE UP (ref 1.01–1.02)
TROPONIN I, HIGH SENSITIVITY RESULT: 301.3 NG/L — HIGH
UROBILINOGEN FLD QL: NEGATIVE — SIGNIFICANT CHANGE UP
WBC # BLD: 11.06 K/UL — HIGH (ref 3.8–10.5)
WBC # BLD: 14.02 K/UL — HIGH (ref 3.8–10.5)
WBC # FLD AUTO: 11.06 K/UL — HIGH (ref 3.8–10.5)
WBC # FLD AUTO: 14.02 K/UL — HIGH (ref 3.8–10.5)
WBC UR QL: ABNORMAL /HPF (ref 0–5)

## 2021-11-30 PROCEDURE — 71045 X-RAY EXAM CHEST 1 VIEW: CPT | Mod: 26

## 2021-11-30 PROCEDURE — 70450 CT HEAD/BRAIN W/O DYE: CPT | Mod: 26

## 2021-11-30 PROCEDURE — 99285 EMERGENCY DEPT VISIT HI MDM: CPT

## 2021-11-30 PROCEDURE — 99223 1ST HOSP IP/OBS HIGH 75: CPT

## 2021-11-30 PROCEDURE — 93010 ELECTROCARDIOGRAM REPORT: CPT

## 2021-11-30 RX ORDER — CHOLECALCIFEROL (VITAMIN D3) 125 MCG
1 CAPSULE ORAL
Qty: 0 | Refills: 0 | DISCHARGE

## 2021-11-30 RX ORDER — LAMOTRIGINE 25 MG/1
3 TABLET, ORALLY DISINTEGRATING ORAL
Qty: 0 | Refills: 0 | DISCHARGE

## 2021-11-30 RX ORDER — CEFTRIAXONE 500 MG/1
1000 INJECTION, POWDER, FOR SOLUTION INTRAMUSCULAR; INTRAVENOUS EVERY 24 HOURS
Refills: 0 | Status: DISCONTINUED | OUTPATIENT
Start: 2021-11-30 | End: 2021-12-06

## 2021-11-30 RX ORDER — LEVETIRACETAM 250 MG/1
1 TABLET, FILM COATED ORAL
Qty: 0 | Refills: 0 | DISCHARGE

## 2021-11-30 RX ORDER — ALENDRONATE SODIUM 70 MG/1
1 TABLET ORAL
Qty: 0 | Refills: 0 | DISCHARGE

## 2021-11-30 RX ORDER — MULTIVIT-MIN/FERROUS GLUCONATE 9 MG/15 ML
1 LIQUID (ML) ORAL DAILY
Refills: 0 | Status: DISCONTINUED | OUTPATIENT
Start: 2021-11-30 | End: 2021-12-06

## 2021-11-30 RX ORDER — ASPIRIN/CALCIUM CARB/MAGNESIUM 324 MG
300 TABLET ORAL ONCE
Refills: 0 | Status: COMPLETED | OUTPATIENT
Start: 2021-11-30 | End: 2021-11-30

## 2021-11-30 RX ORDER — LAMOTRIGINE 25 MG/1
600 TABLET, ORALLY DISINTEGRATING ORAL DAILY
Refills: 0 | Status: DISCONTINUED | OUTPATIENT
Start: 2021-11-30 | End: 2021-12-01

## 2021-11-30 RX ORDER — LEVETIRACETAM 250 MG/1
500 TABLET, FILM COATED ORAL
Refills: 0 | Status: DISCONTINUED | OUTPATIENT
Start: 2021-11-30 | End: 2021-12-01

## 2021-11-30 RX ORDER — FOLIC ACID 0.8 MG
1 TABLET ORAL DAILY
Refills: 0 | Status: DISCONTINUED | OUTPATIENT
Start: 2021-11-30 | End: 2021-12-06

## 2021-11-30 RX ORDER — DONEPEZIL HYDROCHLORIDE 10 MG/1
10 TABLET, FILM COATED ORAL AT BEDTIME
Refills: 0 | Status: DISCONTINUED | OUTPATIENT
Start: 2021-11-30 | End: 2021-12-06

## 2021-11-30 RX ORDER — PIPERACILLIN AND TAZOBACTAM 4; .5 G/20ML; G/20ML
3.38 INJECTION, POWDER, LYOPHILIZED, FOR SOLUTION INTRAVENOUS ONCE
Refills: 0 | Status: COMPLETED | OUTPATIENT
Start: 2021-11-30 | End: 2021-11-30

## 2021-11-30 RX ORDER — ENOXAPARIN SODIUM 100 MG/ML
40 INJECTION SUBCUTANEOUS DAILY
Refills: 0 | Status: DISCONTINUED | OUTPATIENT
Start: 2021-11-30 | End: 2021-12-06

## 2021-11-30 RX ORDER — PHENOBARBITAL 60 MG
64.8 TABLET ORAL EVERY 12 HOURS
Refills: 0 | Status: DISCONTINUED | OUTPATIENT
Start: 2021-11-30 | End: 2021-12-01

## 2021-11-30 RX ORDER — SODIUM CHLORIDE 9 MG/ML
500 INJECTION INTRAMUSCULAR; INTRAVENOUS; SUBCUTANEOUS ONCE
Refills: 0 | Status: COMPLETED | OUTPATIENT
Start: 2021-11-30 | End: 2021-11-30

## 2021-11-30 RX ORDER — RALOXIFENE HYDROCHLORIDE 60 MG/1
60 TABLET, COATED ORAL DAILY
Refills: 0 | Status: DISCONTINUED | OUTPATIENT
Start: 2021-11-30 | End: 2021-12-06

## 2021-11-30 RX ORDER — MULTIVIT-MIN/FERROUS GLUCONATE 9 MG/15 ML
1 LIQUID (ML) ORAL
Qty: 0 | Refills: 0 | DISCHARGE

## 2021-11-30 RX ORDER — SENNA PLUS 8.6 MG/1
2 TABLET ORAL AT BEDTIME
Refills: 0 | Status: DISCONTINUED | OUTPATIENT
Start: 2021-11-30 | End: 2021-12-06

## 2021-11-30 RX ORDER — CHOLECALCIFEROL (VITAMIN D3) 125 MCG
1000 CAPSULE ORAL DAILY
Refills: 0 | Status: DISCONTINUED | OUTPATIENT
Start: 2021-11-30 | End: 2021-12-06

## 2021-11-30 RX ORDER — SODIUM CHLORIDE 9 MG/ML
1000 INJECTION INTRAMUSCULAR; INTRAVENOUS; SUBCUTANEOUS
Refills: 0 | Status: DISCONTINUED | OUTPATIENT
Start: 2021-11-30 | End: 2021-12-05

## 2021-11-30 RX ORDER — ACETAMINOPHEN 500 MG
650 TABLET ORAL EVERY 6 HOURS
Refills: 0 | Status: DISCONTINUED | OUTPATIENT
Start: 2021-11-30 | End: 2021-12-06

## 2021-11-30 RX ADMIN — Medication 2 MILLIGRAM(S): at 21:26

## 2021-11-30 RX ADMIN — Medication 300 MILLIGRAM(S): at 20:51

## 2021-11-30 RX ADMIN — SODIUM CHLORIDE 500 MILLILITER(S): 9 INJECTION INTRAMUSCULAR; INTRAVENOUS; SUBCUTANEOUS at 21:41

## 2021-11-30 RX ADMIN — PIPERACILLIN AND TAZOBACTAM 200 GRAM(S): 4; .5 INJECTION, POWDER, LYOPHILIZED, FOR SOLUTION INTRAVENOUS at 19:27

## 2021-11-30 NOTE — ED PROVIDER NOTE - PROGRESS NOTE DETAILS
Pt with seizure, elevated Trop., will admit pt Case d/w Dr. Klein, will admit.  Case also d/w [facility] [phone] RN Oru, pt's wheelchair bound, @ times pt's cooperative, confused, always mumbles, not verbal

## 2021-11-30 NOTE — H&P ADULT - ATTENDING COMMENTS
Pt seen at bedside  Case discussed with MAR.  86 year old Nursing Home resident with PMH of advanced dementia, seizure disorder, schizophrenia, reported intellectual disability who was brought in on account of a reported seizure disorder. Pt unable to give history and the information from Nursing facility is flimsy at best.    Vital Signs Last 24 Hrs  T(C): 37.6 (30 Nov 2021 18:00), Max: 37.6 (30 Nov 2021 18:00)  T(F): 99.6 (30 Nov 2021 18:00), Max: 99.6 (30 Nov 2021 18:00)  HR: 90 (30 Nov 2021 16:53) (90 - 90)  BP: 135/69 (30 Nov 2021 16:53) (135/69 - 135/69)  RR: 22 (30 Nov 2021 16:53) (22 - 22)  SpO2: 95% (30 Nov 2021 16:53) (95% - 95%)    Awake, aware of surrounding but is non verbal and not following simple command    Labs                         14.5   11.06 )-----------( 267      ( 30 Nov 2021 18:49 )             46.2     MCV low with normal RDW  11-30    129<L>  |  97  |  17  ----------------------------<  141<H>  5.6<H>   |  26  |  0.58    Ca    8.8      30 Nov 2021 18:49  Mg     2.3     11-30    TPro  9.2<H>  /  Alb  3.8  /  TBili  0.4  /  DBili  x   /  AST  45<H>  /  ALT  24  /  AlkPhos  110  11-30    Trop - 301.3  CK within normal    ABG noted  Lactate elevated    UA - +ve wbc, rbcs +ve nitrite, L' esterase     CXR - _ poor asymmetrical positioning in single AP film   Independent review   1) dextrocardia with transposition of the great arteries  2) No obvious acute parenchymal changes noted within the limitation of the positioning and film.      Impression  86 year old woman with PMH noted from prior admission including advanced dementia with severe background intellectual disability, schizophrenia, Seizure dx and chronic hyponatremia from Nursing Home after a seizure episode. Unable to obtain details of the seizure including the number of episodes, timing and length and also compliance with  medications.  Having said that, we would call for more information, medication list     A/P   - Seizure episode in patient with Seizure disorder  r/o Seizure from non-compliance vs breakthrough seizure from other causes including infection.   Check assay of antiseizure meds patient is on   Monitor with seizure and fall precaution   PRN BDZ for recurrence     - UTI  Unable to obtain ROS but will empirically treat for infection  Obtain urine cultures   Empiric antibiotics with IV ceftriaxone 1g daily    - Mild hyponatremia in patient with chronic hyponatremia   Check urine and serum osmolality and serum Na level   No urgent intervention required.    reconcile home meds and restart . Pt seen at bedside  Case discussed with MAR.  86 year old Nursing Home resident with PMH of advanced dementia, seizure disorder, schizophrenia, reported intellectual disability who was brought in on account of a reported seizure disorder. Pt unable to give history and the information from Nursing facility is flimsy at best.    Vital Signs Last 24 Hrs  T(C): 37.6 (30 Nov 2021 18:00), Max: 37.6 (30 Nov 2021 18:00)  T(F): 99.6 (30 Nov 2021 18:00), Max: 99.6 (30 Nov 2021 18:00)  HR: 90 (30 Nov 2021 16:53) (90 - 90)  BP: 135/69 (30 Nov 2021 16:53) (135/69 - 135/69)  RR: 22 (30 Nov 2021 16:53) (22 - 22)  SpO2: 95% (30 Nov 2021 16:53) (95% - 95%)    Awake, aware of surrounding but is non verbal and not following simple command    Labs                         14.5   11.06 )-----------( 267      ( 30 Nov 2021 18:49 )             46.2     MCV low with normal RDW  11-30    129<L>  |  97  |  17  ----------------------------<  141<H>  5.6<H>   |  26  |  0.58    Ca    8.8      30 Nov 2021 18:49  Mg     2.3     11-30    TPro  9.2<H>  /  Alb  3.8  /  TBili  0.4  /  DBili  x   /  AST  45<H>  /  ALT  24  /  AlkPhos  110  11-30    Trop - 301.3  CK within normal    ABG noted  Lactate elevated    UA - +ve wbc, rbcs +ve nitrite, L' esterase     CXR - _ poor asymmetrical positioning in single AP film   Independent review   1) dextrocardia with transposition of the great arteries  2) No obvious acute parenchymal changes noted within the limitation of the positioning and film.      Impression  86 year old woman with PMH noted from prior admission including advanced dementia with severe background intellectual disability, schizophrenia, Seizure dx and chronic hyponatremia from Nursing Home after a seizure episode. Unable to obtain details of the seizure including the number of episodes, timing and length and also compliance with  medications.  Having said that, we would call for more information, medication list     A/P   - Seizure episode in patient with Seizure disorder  r/o Seizure from non-compliance vs breakthrough seizure from other causes including infection.   Check assay of antiseizure meds patient is on   Monitor with seizure and fall precaution   PRN BDZ for recurrence     - Chronic encephalopathy   hx of advanced dementia    - UTI  Unable to obtain ROS but will empirically treat for infection  Obtain urine cultures   Empiric antibiotics with IV ceftriaxone 1g daily    - Mild hyponatremia in patient with chronic hyponatremia   Check urine and serum osmolality and serum Na level   No urgent intervention required.    reconcile home meds and restart .

## 2021-11-30 NOTE — H&P ADULT - PROBLEM SELECTOR PLAN 3
- Patient with elevated troponin 301, most likely demand ischemia from seizure activity  - EKG NSR  - CXR dextrocardia, no acute lung pathology   - F/u troponin - Patient with elevated troponin 301, most likely demand ischemia from seizure activity  - EKG NSR  - CXR no acute lung pathology   - F/u troponin - Patient with elevated troponin 301, most likely demand ischemia from seizure activity  - EKG NSR, no T wave or ST segment changes   - CXR no acute lung pathology   - F/u troponin  - Monitor for now and consider Cardio consult in the am - Patient presented with seizures, found to have UA + nitrites, leucocyte esterase and pyuria, leucocytosis, afebrile    - Started on Rocephin IV   - F/u Ucx, Bcx and adjust medications as needed

## 2021-11-30 NOTE — H&P ADULT - HISTORY OF PRESENT ILLNESS
86 year old female, from [facility], bedbound with past medical hx of severe intellectual disability, seizures, hyponatremia, schizophrenia, OA, thalassemia trait, dementia, peripheral neuropathy, right breast ca s/p mastectomy and cholecystectomy was BIBEMS for seizures and hypoxia 89% on RA. Story obtained from [facility] [address] [facility] documents as brother Devante Funes refused to talk over the phone due to time - 930 pm. He said he knows nothing more than it already says on those documents. He said will call tomorrow am to check on patient's clinical status.     In ED, s/p Levaquin and Zosyn + ASA rectally     GOC Full code  86 year old female, from [facility], bedbound with past medical hx of severe intellectual disability, hypertension, seizures, hyponatremia, schizophrenia, OA, thalassemia trait, dementia, peripheral neuropathy, right breast ca s/p mastectomy and cholecystectomy was BIBEMS for seizures and hypoxia 89% on RA. Story obtained from [facility] [employer] [facility] documents as brother Devante Funes refused to talk over the phone due to time - 930 pm. He said he knows nothing more than it already says on those documents. He said will call tomorrow am to check on patient's clinical status.     In ED, s/p Levaquin and Zosyn + ASA rectally     GOC Full code

## 2021-11-30 NOTE — H&P ADULT - PROBLEM SELECTOR PLAN 5
IMPROVE VTE Individual Risk Assessment  RISK                                                                Points  [  ] Previous VTE                                                  3  [  ] Thrombophilia                                               2  [  ] Lower limb paralysis                                      2        (unable to hold up >15 seconds)    [  ] Current Cancer                                              2         (within 6 months)  [  ] Immobilization > 24 hrs                                1  [  ] ICU/CCU stay > 24 hours                              1  [  ] Age > 60                                                      1  IMPROVE VTE Score _________    PPI for GI prophylaxis  Lovenox for DVT PPX - Patient has history of Hypertension on amlodipine at home   - C/w amlodipine 5 mg reduced dose for now  - DASH diet when not NPO  - Monitor BP and resume to amlodipine 10 mg as needed - Patient presented with mild-moderate chronic hyponatremia on Sodium tabs at home   - Goal is to increase 4-6 mEq in 24 hrs, avoid overcorrection  - C/w oral tabs   - F/u serum osmolarity, urine osmolarity , urine Cr , Urine lytes  - Monitor BMP q12 hrs   - Monitor intake and output   - Seizure and aspiration precautions

## 2021-11-30 NOTE — ED PROVIDER NOTE - SEIZURE NUMBER OF EPISODES
----- Message from Hali Potts MD sent at 4/20/2017 12:49 PM EDT -----  Please call - his kidney function and potassium are fine on the new bp med.  
Airway  Performed by: Sid Davis MD  Authorized by: Sid Davis MD     Final Airway Type:  Endotracheal airway  Final Endotracheal Airway*:  ETT  ETT Size (mm)*:  7.0  Cuff*:  Regular  Technique Used for Successful ETT Placement:  Direct laryngoscopy  Devices/Methods Used in Placement*:  Oral Airway  Blade Type*:  MAC  Blade Size*:  4  Placement Verified by: auscultation and capnometry    Glottic View*:  1 - full view of glottis  Attempts*:  1  Performed By:  Anesthesiologist  Anesthesiologist:  Sid Davis MD        
Pt informed, via voicemail.   
single

## 2021-11-30 NOTE — H&P ADULT - ASSESSMENT
86 year old female, bedbound with severe intellectual disability, seizures, hyponatremia, schizophrenia, OA, thalassemia trait, dementia, peripheral neuropathy is admitted to telemetry for seizures and UTI 86 year old female, bedbound with severe intellectual disability, hypertension, seizures, hyponatremia, schizophrenia, OA, thalassemia trait, dementia, peripheral neuropathy is admitted to telemetry for seizures and UTI

## 2021-11-30 NOTE — ED PROVIDER NOTE - ABDOMINAL EXAM
scars, ventral hernai/soft/nontender.../nondistended scars, ventral hernia/soft/nontender.../nondistended

## 2021-11-30 NOTE — H&P ADULT - NSICDXPASTMEDICALHX_GEN_ALL_CORE_FT
PAST MEDICAL HISTORY:  Dementia     Hearing loss     Hyponatremia     Osteoarthritis     Peripheral neuropathy     Schizophrenia, unspecified type     Seizure     Severe intellectual disability      PAST MEDICAL HISTORY:  Dementia     Hearing loss     Hypertension     Hyponatremia     Osteoarthritis     Peripheral neuropathy     Schizophrenia, unspecified type     Seizure     Severe intellectual disability

## 2021-11-30 NOTE — ED ADULT NURSE NOTE - ED STAT RN HANDOFF DETAILS
received Patient on stretcher, disoriented on time place and situation, none verbal, breathing in room air.  admitted to [facility] on box D, awaiting for bed availability

## 2021-11-30 NOTE — ED ADULT NURSE NOTE - ED STAT RN HANDOFF DETAILS 2
patient awaiting for bed availability, endorsed to Catarino FERREIRA. safety and comfort maintained.

## 2021-11-30 NOTE — ED PROVIDER NOTE - NSICDXPASTMEDICALHX_GEN_ALL_CORE_FT
PAST MEDICAL HISTORY:  Dementia     Hearing loss     Hyponatremia     Osteoporosis     Peripheral neuropathy     Schizophrenia, unspecified type     Seizure      PAST MEDICAL HISTORY:  Dementia     Hearing loss     Hyponatremia     Osteoporosis     Peripheral neuropathy     Schizophrenia, unspecified type     Seizure     Severe intellectual disability

## 2021-11-30 NOTE — H&P ADULT - PROBLEM SELECTOR PLAN 2
- Patient presented with seizures, found to have UA + nitrites and pyuria   - Started on Rocephin IV   - F/u Ucx, Bcx and adjust medications as needed - Patient presented with seizures, found to have UA + nitrites, leucocyte esterase and pyuria   - Started on Rocephin IV   - F/u Ucx, Bcx and adjust medications as needed - Patient presented with seizures, found to have UA + nitrites, leucocyte esterase and pyuria, leucocytosis, afebrile    - Started on Rocephin IV   - F/u Ucx, Bcx and adjust medications as needed Hx of advanced dementia and intellectual deficits  No acute issues  Enhanced supervision   Fall precaution

## 2021-11-30 NOTE — ED PROVIDER NOTE - ENMT, MLM
Airway patent, Nasal mucosa clear. Mouth with normal mucosa. Throat has no vesicles, no oropharyngeal exudates and uvula is midline.  No teeth

## 2021-11-30 NOTE — ED PROVIDER NOTE - CLINICAL SUMMARY MEDICAL DECISION MAKING FREE TEXT BOX
reportedly with seizure, also with coughing, concern for Asp. PNA, will get labs, give Ab, admission

## 2021-11-30 NOTE — ED ADULT TRIAGE NOTE - CHIEF COMPLAINT QUOTE
Seizure activity at the facility after which patient became unresponsive per staff per ems ,responds to painful stimuli now noted

## 2021-11-30 NOTE — ED PROVIDER NOTE - OBJECTIVE STATEMENT
86 y.o. female BIBA reportedly with seizure for ?mins, low O2 sat 89%, nonproductive coughing. 86 y.o. female wheelchair bound BIBA reportedly with seizure for ?mins, low O2 sat 89%, nonproductive coughing.  Pt's unable to provide any informations

## 2021-11-30 NOTE — ED PROVIDER NOTE - CARE PLAN
Principal Discharge DX:	Elevated troponin  Secondary Diagnosis:	Seizure, convulsion  Secondary Diagnosis:	Hyponatremia   1

## 2021-11-30 NOTE — H&P ADULT - PROBLEM SELECTOR PLAN 6
IMPROVE VTE Individual Risk Assessment  RISK                                                                Points  [  ] Previous VTE                                                  3  [  ] Thrombophilia                                               2  [  ] Lower limb paralysis                                      2        (unable to hold up >15 seconds)    [  ] Current Cancer                                              2         (within 6 months)  [  ] Immobilization > 24 hrs                                1  [  ] ICU/CCU stay > 24 hours                              1  [  ] Age > 60                                                      1  IMPROVE VTE Score _________    PPI for GI prophylaxis  Lovenox for DVT PPX - Patient has history of Hypertension on amlodipine at home   - C/w amlodipine 5 mg reduced dose for now  - DASH diet when not NPO  - Monitor BP and resume to amlodipine 10 mg as needed

## 2021-11-30 NOTE — H&P ADULT - PROBLEM SELECTOR PLAN 4
- Patient presented with mild-moderate chronic hyponatremia on Sodium tabs at home   - Goal is to increase 4-6 mEq in 24 hrs, avoid overcorrection  - C/w oral tabs   - F/u serum osmolarity, urine osmolarity , urine Cr , Urine lytes ,  - Monitor BMP q12 hrs   - Monitor intake and output   - Seizure and aspiration precautions - Patient presented with mild-moderate chronic hyponatremia on Sodium tabs at home   - Goal is to increase 4-6 mEq in 24 hrs, avoid overcorrection  - C/w oral tabs   - F/u serum osmolarity, urine osmolarity , urine Cr , Urine lytes  - Monitor BMP q12 hrs   - Monitor intake and output   - Seizure and aspiration precautions - Patient with elevated troponin 301, most likely demand ischemia from seizure activity  - EKG NSR, no T wave or ST segment changes   - CXR no acute lung pathology   - F/u troponin  - Monitor for now and consider Cardio consult in the am

## 2021-12-01 DIAGNOSIS — G93.49 OTHER ENCEPHALOPATHY: ICD-10-CM

## 2021-12-01 LAB
ALBUMIN SERPL ELPH-MCNC: 3.1 G/DL — LOW (ref 3.5–5)
ALP SERPL-CCNC: 87 U/L — SIGNIFICANT CHANGE UP (ref 40–120)
ALT FLD-CCNC: 18 U/L DA — SIGNIFICANT CHANGE UP (ref 10–60)
AMPHET UR-MCNC: NEGATIVE — SIGNIFICANT CHANGE UP
ANION GAP SERPL CALC-SCNC: 5 MMOL/L — SIGNIFICANT CHANGE UP (ref 5–17)
ANION GAP SERPL CALC-SCNC: 7 MMOL/L — SIGNIFICANT CHANGE UP (ref 5–17)
AST SERPL-CCNC: 18 U/L — SIGNIFICANT CHANGE UP (ref 10–40)
BARBITURATES UR SCN-MCNC: POSITIVE
BENZODIAZ UR-MCNC: NEGATIVE — SIGNIFICANT CHANGE UP
BILIRUB SERPL-MCNC: 0.4 MG/DL — SIGNIFICANT CHANGE UP (ref 0.2–1.2)
BLD GP AB SCN SERPL QL: SIGNIFICANT CHANGE UP
BUN SERPL-MCNC: 11 MG/DL — SIGNIFICANT CHANGE UP (ref 7–18)
BUN SERPL-MCNC: 12 MG/DL — SIGNIFICANT CHANGE UP (ref 7–18)
CALCIUM SERPL-MCNC: 8.2 MG/DL — LOW (ref 8.4–10.5)
CALCIUM SERPL-MCNC: 8.3 MG/DL — LOW (ref 8.4–10.5)
CHLORIDE SERPL-SCNC: 98 MMOL/L — SIGNIFICANT CHANGE UP (ref 96–108)
CHLORIDE SERPL-SCNC: 99 MMOL/L — SIGNIFICANT CHANGE UP (ref 96–108)
CK SERPL-CCNC: 76 U/L — SIGNIFICANT CHANGE UP (ref 21–215)
CK SERPL-CCNC: 92 U/L — SIGNIFICANT CHANGE UP (ref 21–215)
CO2 SERPL-SCNC: 25 MMOL/L — SIGNIFICANT CHANGE UP (ref 22–31)
CO2 SERPL-SCNC: 29 MMOL/L — SIGNIFICANT CHANGE UP (ref 22–31)
COCAINE METAB.OTHER UR-MCNC: NEGATIVE — SIGNIFICANT CHANGE UP
CREAT ?TM UR-MCNC: 17 MG/DL — SIGNIFICANT CHANGE UP
CREAT SERPL-MCNC: 0.26 MG/DL — LOW (ref 0.5–1.3)
CREAT SERPL-MCNC: 0.38 MG/DL — LOW (ref 0.5–1.3)
GLUCOSE BLDC GLUCOMTR-MCNC: 76 MG/DL — SIGNIFICANT CHANGE UP (ref 70–99)
GLUCOSE SERPL-MCNC: 126 MG/DL — HIGH (ref 70–99)
GLUCOSE SERPL-MCNC: 80 MG/DL — SIGNIFICANT CHANGE UP (ref 70–99)
HCT VFR BLD CALC: 37.5 % — SIGNIFICANT CHANGE UP (ref 34.5–45)
HGB BLD-MCNC: 11.8 G/DL — SIGNIFICANT CHANGE UP (ref 11.5–15.5)
LACTATE SERPL-SCNC: 0.9 MMOL/L — SIGNIFICANT CHANGE UP (ref 0.7–2)
MAGNESIUM SERPL-MCNC: 1.9 MG/DL — SIGNIFICANT CHANGE UP (ref 1.6–2.6)
MAGNESIUM SERPL-MCNC: 2.2 MG/DL — SIGNIFICANT CHANGE UP (ref 1.6–2.6)
MCHC RBC-ENTMCNC: 22.3 PG — LOW (ref 27–34)
MCHC RBC-ENTMCNC: 31.5 GM/DL — LOW (ref 32–36)
MCV RBC AUTO: 70.8 FL — LOW (ref 80–100)
METHADONE UR-MCNC: NEGATIVE — SIGNIFICANT CHANGE UP
NRBC # BLD: 0 /100 WBCS — SIGNIFICANT CHANGE UP (ref 0–0)
OPIATES UR-MCNC: NEGATIVE — SIGNIFICANT CHANGE UP
OSMOLALITY SERPL: 273 MOSMOL/KG — LOW (ref 280–301)
OSMOLALITY UR: 466 MOS/KG — SIGNIFICANT CHANGE UP (ref 50–1200)
PCP SPEC-MCNC: SIGNIFICANT CHANGE UP
PCP UR-MCNC: NEGATIVE — SIGNIFICANT CHANGE UP
PHOSPHATE SERPL-MCNC: 2.6 MG/DL — SIGNIFICANT CHANGE UP (ref 2.5–4.5)
PHOSPHATE SERPL-MCNC: 2.9 MG/DL — SIGNIFICANT CHANGE UP (ref 2.5–4.5)
PLATELET # BLD AUTO: 231 K/UL — SIGNIFICANT CHANGE UP (ref 150–400)
POTASSIUM SERPL-MCNC: 3.7 MMOL/L — SIGNIFICANT CHANGE UP (ref 3.5–5.3)
POTASSIUM SERPL-MCNC: 3.7 MMOL/L — SIGNIFICANT CHANGE UP (ref 3.5–5.3)
POTASSIUM SERPL-SCNC: 3.7 MMOL/L — SIGNIFICANT CHANGE UP (ref 3.5–5.3)
POTASSIUM SERPL-SCNC: 3.7 MMOL/L — SIGNIFICANT CHANGE UP (ref 3.5–5.3)
PROT SERPL-MCNC: 7.3 G/DL — SIGNIFICANT CHANGE UP (ref 6–8.3)
RBC # BLD: 5.3 M/UL — HIGH (ref 3.8–5.2)
RBC # FLD: 13.9 % — SIGNIFICANT CHANGE UP (ref 10.3–14.5)
SODIUM SERPL-SCNC: 131 MMOL/L — LOW (ref 135–145)
SODIUM SERPL-SCNC: 132 MMOL/L — LOW (ref 135–145)
SODIUM UR-SCNC: 148 MMOL/L — SIGNIFICANT CHANGE UP
THC UR QL: NEGATIVE — SIGNIFICANT CHANGE UP
TROPONIN I, HIGH SENSITIVITY RESULT: 1074.3 NG/L — HIGH
TROPONIN I, HIGH SENSITIVITY RESULT: 1079.2 NG/L — HIGH
UUN UR-MCNC: 285 MG/DL — SIGNIFICANT CHANGE UP
WBC # BLD: 9.29 K/UL — SIGNIFICANT CHANGE UP (ref 3.8–10.5)
WBC # FLD AUTO: 9.29 K/UL — SIGNIFICANT CHANGE UP (ref 3.8–10.5)

## 2021-12-01 PROCEDURE — 99233 SBSQ HOSP IP/OBS HIGH 50: CPT

## 2021-12-01 RX ORDER — PHENOBARBITAL 60 MG
65 TABLET ORAL EVERY 12 HOURS
Refills: 0 | Status: DISCONTINUED | OUTPATIENT
Start: 2021-12-01 | End: 2021-12-05

## 2021-12-01 RX ORDER — PANTOPRAZOLE SODIUM 20 MG/1
40 TABLET, DELAYED RELEASE ORAL DAILY
Refills: 0 | Status: DISCONTINUED | OUTPATIENT
Start: 2021-12-01 | End: 2021-12-05

## 2021-12-01 RX ORDER — LEVETIRACETAM 250 MG/1
500 TABLET, FILM COATED ORAL EVERY 12 HOURS
Refills: 0 | Status: DISCONTINUED | OUTPATIENT
Start: 2021-12-01 | End: 2021-12-05

## 2021-12-01 RX ORDER — PANTOPRAZOLE SODIUM 20 MG/1
40 TABLET, DELAYED RELEASE ORAL
Refills: 0 | Status: DISCONTINUED | OUTPATIENT
Start: 2021-12-01 | End: 2021-12-01

## 2021-12-01 RX ORDER — LAMOTRIGINE 25 MG/1
300 TABLET, ORALLY DISINTEGRATING ORAL
Refills: 0 | Status: DISCONTINUED | OUTPATIENT
Start: 2021-12-01 | End: 2021-12-05

## 2021-12-01 RX ADMIN — LEVETIRACETAM 420 MILLIGRAM(S): 250 TABLET, FILM COATED ORAL at 18:51

## 2021-12-01 RX ADMIN — CEFTRIAXONE 100 MILLIGRAM(S): 500 INJECTION, POWDER, FOR SOLUTION INTRAMUSCULAR; INTRAVENOUS at 02:06

## 2021-12-01 RX ADMIN — PANTOPRAZOLE SODIUM 40 MILLIGRAM(S): 20 TABLET, DELAYED RELEASE ORAL at 12:16

## 2021-12-01 RX ADMIN — ENOXAPARIN SODIUM 40 MILLIGRAM(S): 100 INJECTION SUBCUTANEOUS at 12:16

## 2021-12-01 RX ADMIN — SODIUM CHLORIDE 90 MILLILITER(S): 9 INJECTION INTRAMUSCULAR; INTRAVENOUS; SUBCUTANEOUS at 02:06

## 2021-12-01 RX ADMIN — Medication 65 MILLIGRAM(S): at 18:51

## 2021-12-01 NOTE — PROGRESS NOTE ADULT - PROBLEM SELECTOR PLAN 1
Patient with history of seizures on Lamotrigine and Phenobarbital at home   - Seizure most likely from acute UTI vs medication non compliance   - EKG showed NSR, Trop + x3, CTH negative for acute intracranial pathology  - NPO except meds, FS q6hrs   - Started Ativan 2 mg IVP PRN for breakthrough seizures   - F/u official S & S, EEG  - F/u electrolytes, Utox, Lamotrigine and Keppra levels   - Admitted to telemetry   - Seizure/aspiration/fall precautions  -c/w Keppra and phenobarbital  - Neuro Dr Oneill consulted.

## 2021-12-01 NOTE — ED ADULT NURSE REASSESSMENT NOTE - NS ED NURSE REASSESS COMMENT FT1
Patient observed, sleeping most of the time arouse verbal stimuli, none verbal A@Ox0, V/S WNL. HOB elevated, no episode seizure activity in ED. Patient unable to swallow PO meds NP Asaf made aware. safety and comfort maintained.

## 2021-12-01 NOTE — CONSULT NOTE ADULT - ASSESSMENT
86 year old female, from [facility], bedbound with past medical hx of severe intellectual disability, hypertension, seizures, hyponatremia, schizophrenia, OA, thalassemia trait, dementia, peripheral neuropathy, right breast ca s/p mastectomy and cholecystectomy was BIBEMS for seizures and hypoxia 89% on RA,type II MI due to demand ischemia.  1.Seizures-ativan,keppra,phenobarbital-neurology eval.  2.Echocardiogram.  3.Type II MI due to demand ischemia.  4.UTI-abx.  5.add asa 81mg qd.  6.GI and DVT prophylaxis.

## 2021-12-01 NOTE — PROGRESS NOTE ADULT - SUBJECTIVE AND OBJECTIVE BOX
NP Note discussed with Primary Attending.    Patient is a 86y old  Female who presents with a chief complaint of UTI AND SEIZURES (01 Dec 2021 12:42)      INTERVAL HPI/OVERNIGHT EVENTS: no new complaints    MEDICATIONS  (STANDING):  calcium carbonate 1250 mG  + Vitamin D (OsCal 500 + D) 1 Tablet(s) Oral two times a day  cefTRIAXone   IVPB 1000 milliGRAM(s) IV Intermittent every 24 hours  cholecalciferol 1000 Unit(s) Oral daily  donepezil 10 milliGRAM(s) Oral at bedtime  enoxaparin Injectable 40 milliGRAM(s) SubCutaneous daily  folic acid 1 milliGRAM(s) Oral daily  lamoTRIgine 300 milliGRAM(s) Oral two times a day  levETIRAcetam  IVPB 500 milliGRAM(s) IV Intermittent every 12 hours  multivitamin/minerals 1 Tablet(s) Oral daily  pantoprazole  Injectable 40 milliGRAM(s) IV Push daily  PHENobarbital Injectable 65 milliGRAM(s) IV Push every 12 hours  raloxifene 60 milliGRAM(s) Oral daily  senna 2 Tablet(s) Oral at bedtime  sodium chloride 0.9%. 1000 milliLiter(s) (90 mL/Hr) IV Continuous <Continuous>    MEDICATIONS  (PRN):  acetaminophen  Suppository .. 650 milliGRAM(s) Rectal every 6 hours PRN Temp greater or equal to 38C (100.4F), Mild Pain (1 - 3)  LORazepam   Injectable 2 milliGRAM(s) IV Push every 4 hours PRN seizures      __________________________________________________  REVIEW OF SYSTEMS:    CONSTITUTIONAL: No fever,   EYES: no acute visual disturbances  NECK: No pain or stiffness  RESPIRATORY: No cough; No shortness of breath  CARDIOVASCULAR: No chest pain, no palpitations  GASTROINTESTINAL: No pain. No nausea or vomiting; No diarrhea   NEUROLOGICAL: No headache or numbness, no tremors  MUSCULOSKELETAL: No joint pain, no muscle pain  GENITOURINARY: no dysuria, no frequency, no hesitancy  PSYCHIATRY: no depression , no anxiety  ALL OTHER  ROS negative        Vital Signs Last 24 Hrs  T(C): 36.9 (01 Dec 2021 11:07), Max: 37.6 (2021 18:00)  T(F): 98.4 (01 Dec 2021 11:07), Max: 99.6 (2021 18:00)  HR: 76 (01 Dec 2021 11:07) (74 - 90)  BP: 117/80 (01 Dec 2021 11:07) (113/76 - 135/78)  BP(mean): --  RR: 16 (01 Dec 2021 11:07) (16 - 22)  SpO2: 96% (01 Dec 2021 11:07) (95% - 98%)    ________________________________________________  PHYSICAL EXAM:  GENERAL: NAD  HEENT: Normocephalic;  conjunctivae and sclerae clear; moist mucous membranes;   NECK : supple  CHEST/LUNG: Clear to auscultation bilaterally with good air entry   HEART: S1 S2  regular; no murmurs, gallops or rubs  ABDOMEN: Soft, Nontender, Nondistended; Bowel sounds present  EXTREMITIES: no cyanosis; no edema; no calf tenderness  SKIN: warm and dry; no rash  NERVOUS SYSTEM:  Awake and alert; Disoriented  to place, person and time ; no new deficits    _________________________________________________  LABS:                        11.8   9.29  )-----------( 231      ( 01 Dec 2021 06:44 )             37.5     12-    132<L>  |  98  |  12  ----------------------------<  80  3.7   |  29  |  0.38<L>    Ca    8.2<L>      01 Dec 2021 06:44  Phos  2.9     12-  Mg     2.2     12-    TPro  7.3  /  Alb  3.1<L>  /  TBili  0.4  /  DBili  x   /  AST  18  /  ALT  18  /  AlkPhos  87        Urinalysis Basic - ( 2021 18:49 )    Color: Yellow / Appearance: Slightly Turbid / S.025 / pH: x  Gluc: x / Ketone: Negative  / Bili: Negative / Urobili: Negative   Blood: x / Protein: 100 / Nitrite: Positive   Leuk Esterase: Moderate / RBC: 5-10 /HPF / WBC 26-50 /HPF   Sq Epi: x / Non Sq Epi: Moderate /HPF / Bacteria: Moderate /HPF      CAPILLARY BLOOD GLUCOSE      POCT Blood Glucose.: 129 mg/dL (2021 17:18)        RADIOLOGY & ADDITIONAL TESTS:  EXAM:  CT BRAIN                            PROCEDURE DATE:  2021          INTERPRETATION:  CLINICAL INFORMATION: Confusion.    TECHNIQUE:  Axial CT images were acquired through the head.  Intravenous contrast: None  Two-dimensional reformats were generated.    COMPARISON STUDY: 05/10/2019.    FINDINGS:  The CT scan was performed twice due to head motion.  Both sets of images are moderately to severely motion degraded.  There is no gross CT evidence of acute intracranial hemorrhage, mass effect, midline shift or a large acute territorial infarct.    Moderate generalized cerebral volume loss and ventriculomegaly is unchanged from 05/10/2019. Periventricular white matter hypoattenuation seen on 05/10/2019 is not well evaluated on the current scan due to motion artifact.      The paranasal sinuses and mastoids are grossly clear..    There is no evidence of a calvarial lesion.    IMPRESSION:  Moderate to severe head motion artifact.  No gross evidence of acute intracranial pathology.  Repeat imaging is recommended when the patient is able to cooperate.    --- End of Report ---      EXAM:  XR CHEST PORTABLE IMMED 1V                            PROCEDURE DATE:  2021          INTERPRETATION:  Clinical history: 86 year old female, shortness of breath.    Portable/markedly rotated view of the chest without comparison demonstrates a normal cardiac silhouette and normal pulmonary vasculature with no consolidation, effusion, gross adenopathy, pneumothorax or acute osseous finding.    Multiple skinfolds projected over the right hemithorax. Post cholecystectomy.    IMPRESSION:  Markedly limited/rotated view, no acute findings    --- End of Report ---            MARLON GUZMAN DO; Attending Radiologist  This document has been electronically signed. Dec  1 2021  8:40AM    Imaging  Reviewed:  YES/NO    Consultant(s) Notes Reviewed:   YES/ No      Plan of care was discussed with patient and /or primary care giver; all questions and concerns were addressed

## 2021-12-01 NOTE — ED ADULT NURSE REASSESSMENT NOTE - NS ED NURSE REASSESS COMMENT FT1
715 am pt resting now . report given to day shift  RN . straight cath done urine sent to lab as ordered

## 2021-12-01 NOTE — CONSULT NOTE ADULT - SUBJECTIVE AND OBJECTIVE BOX
CHIEF COMPLAINT:Patient is a 86y old  Female who presents with a chief complaint of UTI AND SEIZURES (30 Nov 2021 21:03)      HPI:  86 year old female, from [facility], bedbound with past medical hx of severe intellectual disability, hypertension, seizures, hyponatremia, schizophrenia, OA, thalassemia trait, dementia, peripheral neuropathy, right breast ca s/p mastectomy and cholecystectomy was BIBEMS for seizures and hypoxia 89% on RA. Story obtained from [facility] [facility] documents as brother Devante Funes refused to talk over the phone due to time - 930 pm. He said he knows nothing more than it already says on those documents. He said will call tomorrow am to check on patient's clinical status.     In ED, s/p Levaquin and Zosyn + ASA rectally     GOC Full code  (30 Nov 2021 21:03)      PAST MEDICAL & SURGICAL HISTORY:  Dementia    Schizophrenia, unspecified type    Hearing loss    Peripheral neuropathy    Seizure    Hyponatremia    Severe intellectual disability    Osteoarthritis    Hypertension    H/O mastectomy, right    Family history of cholecystectomy        MEDICATIONS  (STANDING):  calcium carbonate 1250 mG  + Vitamin D (OsCal 500 + D) 1 Tablet(s) Oral two times a day  cefTRIAXone   IVPB 1000 milliGRAM(s) IV Intermittent every 24 hours  cholecalciferol 1000 Unit(s) Oral daily  donepezil 10 milliGRAM(s) Oral at bedtime  enoxaparin Injectable 40 milliGRAM(s) SubCutaneous daily  folic acid 1 milliGRAM(s) Oral daily  lamoTRIgine 300 milliGRAM(s) Oral two times a day  levETIRAcetam  IVPB 500 milliGRAM(s) IV Intermittent every 12 hours  multivitamin/minerals 1 Tablet(s) Oral daily  pantoprazole  Injectable 40 milliGRAM(s) IV Push daily  PHENobarbital Injectable 65 milliGRAM(s) IV Push every 12 hours  raloxifene 60 milliGRAM(s) Oral daily  senna 2 Tablet(s) Oral at bedtime  sodium chloride 0.9%. 1000 milliLiter(s) (90 mL/Hr) IV Continuous <Continuous>    MEDICATIONS  (PRN):  acetaminophen  Suppository .. 650 milliGRAM(s) Rectal every 6 hours PRN Temp greater or equal to 38C (100.4F), Mild Pain (1 - 3)  LORazepam   Injectable 2 milliGRAM(s) IV Push every 4 hours PRN seizures      FAMILY HISTORY:unable to obtain      SOCIAL HISTORY:    unable to obtain    Allergies    No Known Allergies    Intolerances    	    REVIEW OF SYSTEMS:    [x ] Unable to obtain    PHYSICAL EXAM:  T(C): 36.9 (12-01-21 @ 11:07), Max: 37.6 (11-30-21 @ 18:00)  HR: 76 (12-01-21 @ 11:07) (74 - 90)  BP: 117/80 (12-01-21 @ 11:07) (113/76 - 135/78)  RR: 16 (12-01-21 @ 11:07) (16 - 22)  SpO2: 96% (12-01-21 @ 11:07) (95% - 98%)  Wt(kg): --  I&O's Summary      Appearance: Normal	  HEENT:   Normal oral mucosa, PERRL, EOMI	  Lymphatic: No lymphadenopathy  Cardiovascular: Normal S1 S2, No JVD, No murmurs, No edema  Respiratory: Lungs clear to auscultation	  Gastrointestinal:  Soft, Non-tender, + BS	  Skin: No rashes, No ecchymoses, No cyanosis	  Extremities: Normal range of motion, No clubbing, cyanosis or edema  Vascular: Peripheral pulses palpable 2+ bilaterally    	    ECG:  	nsr,nl axis  	  	  LABS:	 	    CARDIAC MARKERS:  CARDIAC MARKERS ( 01 Dec 2021 06:44 )  x     / x     / 92 U/L / x     / x      CARDIAC MARKERS ( 30 Nov 2021 23:49 )  x     / x     / 76 U/L / x     / x      CARDIAC MARKERS ( 30 Nov 2021 18:49 )  x     / x     / 135 U/L / x     / x          Troponin I, High Sensitivity Result: 1079.2 ng/L (12-01-21 @ 06:44)  Troponin I, High Sensitivity Result: 1074.3 ng/L (11-30-21 @ 23:49)  Troponin I, High Sensitivity Result: 301.3 ng/L (11-30-21 @ 18:49)                          11.8   9.29  )-----------( 231      ( 01 Dec 2021 06:44 )             37.5     12-01    132<L>  |  98  |  12  ----------------------------<  80  3.7   |  29  |  0.38<L>    Ca    8.2<L>      01 Dec 2021 06:44  Phos  2.9     12-01  Mg     2.2     12-01    TPro  7.3  /  Alb  3.1<L>  /  TBili  0.4  /  DBili  x   /  AST  18  /  ALT  18  /  AlkPhos  87  11-30    proBNP: Serum Pro-Brain Natriuretic Peptide: 214 pg/mL (11-30 @ 18:49)    Lipid Profile:   HgA1c:   TSH:     PREVIOUS DIAGNOSTIC TESTING:    [ ] Echocardiogram:  [ ]  Catheterization:  [ ] Stress Test:

## 2021-12-01 NOTE — PROGRESS NOTE ADULT - PROBLEM SELECTOR PLAN 3
Patient presented with seizures, found to have UA + nitrites, leucocyte esterase and pyuria, leucocytosis, afebrile    - Started on Rocephin IV   - F/u Ucx, Bcx and adjust medications as needed.

## 2021-12-01 NOTE — PATIENT PROFILE ADULT - VISION (WITH CORRECTIVE LENSES IF THE PATIENT USUALLY WEARS THEM):
Unable to assess (HERNANDEZ)/Normal vision: sees adequately in most situations; can see medication labels, newsprint

## 2021-12-01 NOTE — PATIENT PROFILE ADULT - FALL HARM RISK - HARM RISK INTERVENTIONS
Assistance with ambulation/Assistance OOB with selected safe patient handling equipment/Communicate Risk of Fall with Harm to all staff/Discuss with provider need for PT consult/Monitor gait and stability/Reinforce activity limits and safety measures with patient and family/Tailored Fall Risk Interventions/Visual Cue: Yellow wristband and red socks/Bed in lowest position, wheels locked, appropriate side rails in place/Call bell, personal items and telephone in reach/Instruct patient to call for assistance before getting out of bed or chair/Non-slip footwear when patient is out of bed/[place] to call system/Physically safe environment - no spills, clutter or unnecessary equipment/Purposeful Proactive Rounding/Room/bathroom lighting operational, light cord in reach

## 2021-12-01 NOTE — PROGRESS NOTE ADULT - PROBLEM SELECTOR PLAN 2
Hx of advanced dementia and intellectual deficits  No acute issues  Enhanced supervision   Fall precaution.

## 2021-12-01 NOTE — PROGRESS NOTE ADULT - PROBLEM SELECTOR PLAN 6
Patient presented with mild-moderate chronic hyponatremia on Sodium tabs at home   - Goal is to increase 4-6 mEq in 24 hrs, avoid overcorrection  - NS IV to correct NA while NPO  - resume oral tabs after pass S&S   - F/u serum osmolarity, urine osmolarity , urine Cr , Urine lytes  - Monitor BMP q12 hrs   - Monitor intake and output   - Seizure and aspiration precautions.

## 2021-12-02 LAB
ALBUMIN SERPL ELPH-MCNC: 3.3 G/DL — LOW (ref 3.5–5)
ALP SERPL-CCNC: 92 U/L — SIGNIFICANT CHANGE UP (ref 40–120)
ALT FLD-CCNC: 22 U/L DA — SIGNIFICANT CHANGE UP (ref 10–60)
ANION GAP SERPL CALC-SCNC: 7 MMOL/L — SIGNIFICANT CHANGE UP (ref 5–17)
APPEARANCE UR: CLEAR — SIGNIFICANT CHANGE UP
AST SERPL-CCNC: 24 U/L — SIGNIFICANT CHANGE UP (ref 10–40)
BACTERIA # UR AUTO: ABNORMAL /HPF
BILIRUB SERPL-MCNC: 0.6 MG/DL — SIGNIFICANT CHANGE UP (ref 0.2–1.2)
BILIRUB UR-MCNC: NEGATIVE — SIGNIFICANT CHANGE UP
BUN SERPL-MCNC: 14 MG/DL — SIGNIFICANT CHANGE UP (ref 7–18)
CALCIUM SERPL-MCNC: 9.2 MG/DL — SIGNIFICANT CHANGE UP (ref 8.4–10.5)
CHLORIDE SERPL-SCNC: 99 MMOL/L — SIGNIFICANT CHANGE UP (ref 96–108)
CO2 SERPL-SCNC: 28 MMOL/L — SIGNIFICANT CHANGE UP (ref 22–31)
COLOR SPEC: YELLOW — SIGNIFICANT CHANGE UP
CREAT SERPL-MCNC: 0.39 MG/DL — LOW (ref 0.5–1.3)
DIFF PNL FLD: NEGATIVE — SIGNIFICANT CHANGE UP
EPI CELLS # UR: ABNORMAL /HPF
GLUCOSE BLDC GLUCOMTR-MCNC: 82 MG/DL — SIGNIFICANT CHANGE UP (ref 70–99)
GLUCOSE BLDC GLUCOMTR-MCNC: 85 MG/DL — SIGNIFICANT CHANGE UP (ref 70–99)
GLUCOSE BLDC GLUCOMTR-MCNC: 87 MG/DL — SIGNIFICANT CHANGE UP (ref 70–99)
GLUCOSE BLDC GLUCOMTR-MCNC: 88 MG/DL — SIGNIFICANT CHANGE UP (ref 70–99)
GLUCOSE SERPL-MCNC: 76 MG/DL — SIGNIFICANT CHANGE UP (ref 70–99)
GLUCOSE UR QL: NEGATIVE — SIGNIFICANT CHANGE UP
HCT VFR BLD CALC: 41.5 % — SIGNIFICANT CHANGE UP (ref 34.5–45)
HGB BLD-MCNC: 12.7 G/DL — SIGNIFICANT CHANGE UP (ref 11.5–15.5)
KETONES UR-MCNC: ABNORMAL
LAMOTRIGINE SERPL-MCNC: 2.6 UG/ML — SIGNIFICANT CHANGE UP (ref 2–20)
LEUKOCYTE ESTERASE UR-ACNC: ABNORMAL
MCHC RBC-ENTMCNC: 21.9 PG — LOW (ref 27–34)
MCHC RBC-ENTMCNC: 30.6 GM/DL — LOW (ref 32–36)
MCV RBC AUTO: 71.4 FL — LOW (ref 80–100)
NITRITE UR-MCNC: NEGATIVE — SIGNIFICANT CHANGE UP
NRBC # BLD: 0 /100 WBCS — SIGNIFICANT CHANGE UP (ref 0–0)
PH UR: 6 — SIGNIFICANT CHANGE UP (ref 5–8)
PLATELET # BLD AUTO: 222 K/UL — SIGNIFICANT CHANGE UP (ref 150–400)
POTASSIUM SERPL-MCNC: 3.9 MMOL/L — SIGNIFICANT CHANGE UP (ref 3.5–5.3)
POTASSIUM SERPL-SCNC: 3.9 MMOL/L — SIGNIFICANT CHANGE UP (ref 3.5–5.3)
PROT SERPL-MCNC: 7.9 G/DL — SIGNIFICANT CHANGE UP (ref 6–8.3)
PROT UR-MCNC: 30 MG/DL
RBC # BLD: 5.81 M/UL — HIGH (ref 3.8–5.2)
RBC # FLD: 14.3 % — SIGNIFICANT CHANGE UP (ref 10.3–14.5)
RBC CASTS # UR COMP ASSIST: SIGNIFICANT CHANGE UP /HPF (ref 0–2)
SODIUM SERPL-SCNC: 134 MMOL/L — LOW (ref 135–145)
SP GR SPEC: 1.01 — SIGNIFICANT CHANGE UP (ref 1.01–1.02)
UROBILINOGEN FLD QL: NEGATIVE — SIGNIFICANT CHANGE UP
WBC # BLD: 5.31 K/UL — SIGNIFICANT CHANGE UP (ref 3.8–10.5)
WBC # FLD AUTO: 5.31 K/UL — SIGNIFICANT CHANGE UP (ref 3.8–10.5)
WBC UR QL: ABNORMAL /HPF (ref 0–5)

## 2021-12-02 PROCEDURE — 99233 SBSQ HOSP IP/OBS HIGH 50: CPT | Mod: GC

## 2021-12-02 PROCEDURE — 99222 1ST HOSP IP/OBS MODERATE 55: CPT

## 2021-12-02 RX ORDER — OLANZAPINE 15 MG/1
2.5 TABLET, FILM COATED ORAL ONCE
Refills: 0 | Status: COMPLETED | OUTPATIENT
Start: 2021-12-02 | End: 2021-12-02

## 2021-12-02 RX ORDER — ASPIRIN/CALCIUM CARB/MAGNESIUM 324 MG
81 TABLET ORAL DAILY
Refills: 0 | Status: DISCONTINUED | OUTPATIENT
Start: 2021-12-02 | End: 2021-12-06

## 2021-12-02 RX ADMIN — OLANZAPINE 2.5 MILLIGRAM(S): 15 TABLET, FILM COATED ORAL at 17:20

## 2021-12-02 RX ADMIN — LEVETIRACETAM 420 MILLIGRAM(S): 250 TABLET, FILM COATED ORAL at 05:08

## 2021-12-02 RX ADMIN — CEFTRIAXONE 100 MILLIGRAM(S): 500 INJECTION, POWDER, FOR SOLUTION INTRAMUSCULAR; INTRAVENOUS at 21:32

## 2021-12-02 RX ADMIN — CEFTRIAXONE 100 MILLIGRAM(S): 500 INJECTION, POWDER, FOR SOLUTION INTRAMUSCULAR; INTRAVENOUS at 13:32

## 2021-12-02 RX ADMIN — LEVETIRACETAM 420 MILLIGRAM(S): 250 TABLET, FILM COATED ORAL at 17:20

## 2021-12-02 RX ADMIN — Medication 65 MILLIGRAM(S): at 17:20

## 2021-12-02 RX ADMIN — Medication 0.5 MILLIGRAM(S): at 00:50

## 2021-12-02 RX ADMIN — ENOXAPARIN SODIUM 40 MILLIGRAM(S): 100 INJECTION SUBCUTANEOUS at 13:34

## 2021-12-02 RX ADMIN — Medication 65 MILLIGRAM(S): at 05:08

## 2021-12-02 NOTE — PROGRESS NOTE ADULT - SUBJECTIVE AND OBJECTIVE BOX
CHIEF COMPLAINT:Patient is a 86y old  Female who presents with a chief complaint of UTI AND SEIZURES .Pt appears comfortable.    	  REVIEW OF SYSTEMS:    [x ] Unable to obtain    PHYSICAL EXAM:  T(C): 36.4 (12-02-21 @ 11:06), Max: 36.7 (12-02-21 @ 07:23)  HR: 58 (12-02-21 @ 11:06) (58 - 130)  BP: 123/71 (12-02-21 @ 11:06) (116/75 - 141/77)  RR: 20 (12-02-21 @ 11:06) (16 - 20)  SpO2: 97% (12-02-21 @ 11:06) (96% - 100%)  Wt(kg): --  I&O's Summary      Appearance: Normal	  HEENT:   Normal oral mucosa, PERRL, EOMI	  Lymphatic: No lymphadenopathy  Cardiovascular: Normal S1 S2, No JVD, No murmurs, No edema  Respiratory: Lungs clear to auscultation	  Gastrointestinal:  Soft, Non-tender, + BS	  Skin: No rashes, No ecchymoses, No cyanosis	  Extremities: Normal range of motion, No clubbing, cyanosis or edema  Vascular: Peripheral pulses palpable 2+ bilaterally    MEDICATIONS  (STANDING):  aspirin  chewable 81 milliGRAM(s) Oral daily  calcium carbonate 1250 mG  + Vitamin D (OsCal 500 + D) 1 Tablet(s) Oral two times a day  cefTRIAXone   IVPB 1000 milliGRAM(s) IV Intermittent every 24 hours  cholecalciferol 1000 Unit(s) Oral daily  donepezil 10 milliGRAM(s) Oral at bedtime  enoxaparin Injectable 40 milliGRAM(s) SubCutaneous daily  folic acid 1 milliGRAM(s) Oral daily  lamoTRIgine 300 milliGRAM(s) Oral two times a day  levETIRAcetam  IVPB 500 milliGRAM(s) IV Intermittent every 12 hours  multivitamin/minerals 1 Tablet(s) Oral daily  pantoprazole  Injectable 40 milliGRAM(s) IV Push daily  PHENobarbital Injectable 65 milliGRAM(s) IV Push every 12 hours  raloxifene 60 milliGRAM(s) Oral daily  senna 2 Tablet(s) Oral at bedtime  sodium chloride 0.9%. 1000 milliLiter(s) (90 mL/Hr) IV Continuous <Continuous>        LABS:	 	    CARDIAC MARKERS ( 01 Dec 2021 06:44 )  x     / x     / 92 U/L / x     / x      CARDIAC MARKERS ( 30 Nov 2021 23:49 )  x     / x     / 76 U/L / x     / x      CARDIAC MARKERS ( 30 Nov 2021 18:49 )  x     / x     / 135 U/L / x     / x          Troponin I, High Sensitivity Result: 1079.2 ng/L (12-01 @ 06:44)  Troponin I, High Sensitivity Result: 1074.3 ng/L (11-30 @ 23:49)  Troponin I, High Sensitivity Result: 301.3 ng/L (11-30 @ 18:49)                            12.7   5.31  )-----------( 222      ( 02 Dec 2021 06:27 )             41.5     12-02    134<L>  |  99  |  14  ----------------------------<  76  3.9   |  28  |  0.39<L>    Ca    9.2      02 Dec 2021 06:27  Phos  2.9     12-01  Mg     2.2     12-01    TPro  7.9  /  Alb  3.3<L>  /  TBili  0.6  /  DBili  x   /  AST  24  /  ALT  22  /  AlkPhos  92  12-02    proBNP: Serum Pro-Brain Natriuretic Peptide: 214 pg/mL (11-30 @ 18:49)      	    ec

## 2021-12-02 NOTE — PROGRESS NOTE ADULT - SUBJECTIVE AND OBJECTIVE BOX
CC:   INTERVAL HPI/OVERNIGHT EVENTS: Pt seen and examined at bedside this AM by Hospitalist and PA.     REVIEW OF SYSTEMS:  CONSTITUTIONAL: No fever, weight loss, or fatigue  EYES: No eye pain, visual disturbances, or discharge  ENT:  No difficulty hearing, tinnitus, vertigo; No sinus or throat pain  NECK: No pain or stiffness  RESPIRATORY: No cough, wheezing, chills or hemoptysis; No shortness of breath  CARDIOVASCULAR: No chest pain, palpitations, dizziness, or leg swelling  GASTROINTESTINAL: No abdominal or epigastric pain. No nausea, vomiting or hematemesis; No diarrhea or constipation  GENITOURINARY: No dysuria, frequency, hematuria, or incontinence  NEUROLOGICAL: No headaches, memory loss, loss of strength, numbness, or tremors  SKIN: No itching, burning, rashes, or lesions   MUSCULOSKELETAL: No joint pain or swelling; No muscle, back, or extremity pain    Allergies    No Known Allergies    Intolerances      HEALTH ISSUES - PROBLEM Dx:  Seizures    Acute UTI    Chronic hyponatremia    Prophylactic measure    Elevated troponin    Hypertension    Encephalopathy chronic        PAST MEDICAL & SURGICAL HISTORY:  Dementia    Schizophrenia, unspecified type    Hearing loss    Peripheral neuropathy    Seizure    Hyponatremia    Severe intellectual disability    Osteoarthritis    Hypertension    H/O mastectomy, right    Family history of cholecystectomy      VITAL SIGNS:  T(C): 36.6 (21 @ 15:18), Max: 36.7 (21 @ 07:23)  HR: 79 (21 @ 15:18) (58 - 130)  BP: 134/85 (21 @ 15:18) (116/75 - 141/77)  RR: 19 (21 @ 15:18) (16 - 20)  SpO2: 96% (21 @ 15:18) (96% - 100%)  Wt(kg): --Vital Signs Last 24 Hrs  T(C): 36.6 (02 Dec 2021 15:18), Max: 36.7 (02 Dec 2021 07:23)  T(F): 97.9 (02 Dec 2021 15:18), Max: 98 (02 Dec 2021 07:23)  HR: 79 (02 Dec 2021 15:18) (58 - 130)  BP: 134/85 (02 Dec 2021 15:18) (116/75 - 141/77)  BP(mean): --  RR: 19 (02 Dec 2021 15:18) (16 - 20)  SpO2: 96% (02 Dec 2021 15:18) (96% - 100%)    PHYSICAL EXAM:  GENERAL: Pt lying in bed comfortably in NAD  HEAD:  Atraumatic  EYES: EOMI, PERRL, conjunctiva and sclera clear  ENT: Moist mucous membranes  NECK: No JVD  CHEST/LUNG: Clear to auscultation bilaterally; No rales, rhonchi, wheezing, or rubs. Unlabored respirations  HEART: S1, S2, Regular rate and rhythm   ABDOMEN: Bowel sounds present; Soft, Nontender, Nondistended. No guarding or rigidity   EXTREMITIES:  2+ Peripheral Pulses, brisk capillary refill. No clubbing, cyanosis, or edema  NERVOUS SYSTEM:  Alert & Oriented X3, speech clear, FROM x 4 extremities. No deficits   SKIN: No rashes or lesions    LABS:                        12.7   5.31  )-----------( 222      ( 02 Dec 2021 06:27 )             41.5     12-02    134<L>  |  99  |  14  ----------------------------<  76  3.9   |  28  |  0.39<L>    Ca    9.2      02 Dec 2021 06:27  Phos  2.9     12-  Mg     2.2     12-    TPro  7.9  /  Alb  3.3<L>  /  TBili  0.6  /  DBili  x   /  AST  24  /  ALT  22  /  AlkPhos  92  12-02      CAPILLARY BLOOD GLUCOSE      POCT Blood Glucose.: 88 mg/dL (02 Dec 2021 16:38)  POCT Blood Glucose.: 87 mg/dL (02 Dec 2021 11:51)  POCT Blood Glucose.: 82 mg/dL (02 Dec 2021 04:58)  POCT Blood Glucose.: 76 mg/dL (01 Dec 2021 21:47)    ABG - ( 2021 18:54 )  pH, Arterial: 7.37  pH, Blood: x     /  pCO2: 46    /  pO2: 275   / HCO3: 27    / Base Excess: 0.8   /  SaO2: 100               Urinalysis Basic - ( 02 Dec 2021 13:35 )    Color: Yellow / Appearance: Clear / S.015 / pH: x  Gluc: x / Ketone: Moderate  / Bili: Negative / Urobili: Negative   Blood: x / Protein: 30 mg/dL / Nitrite: Negative   Leuk Esterase: Trace / RBC: 0-2 /HPF / WBC 11-25 /HPF   Sq Epi: x / Non Sq Epi: Occasional /HPF / Bacteria: Few /HPF      MEDICATIONS  (STANDING):  aspirin  chewable 81 milliGRAM(s) Oral daily  calcium carbonate 1250 mG  + Vitamin D (OsCal 500 + D) 1 Tablet(s) Oral two times a day  cefTRIAXone   IVPB 1000 milliGRAM(s) IV Intermittent every 24 hours  cholecalciferol 1000 Unit(s) Oral daily  donepezil 10 milliGRAM(s) Oral at bedtime  enoxaparin Injectable 40 milliGRAM(s) SubCutaneous daily  folic acid 1 milliGRAM(s) Oral daily  lamoTRIgine 300 milliGRAM(s) Oral two times a day  levETIRAcetam  IVPB 500 milliGRAM(s) IV Intermittent every 12 hours  multivitamin/minerals 1 Tablet(s) Oral daily  OLANZapine Injectable 2.5 milliGRAM(s) IntraMuscular once  pantoprazole  Injectable 40 milliGRAM(s) IV Push daily  PHENobarbital Injectable 65 milliGRAM(s) IV Push every 12 hours  raloxifene 60 milliGRAM(s) Oral daily  senna 2 Tablet(s) Oral at bedtime  sodium chloride 0.9%. 1000 milliLiter(s) (90 mL/Hr) IV Continuous <Continuous>    MEDICATIONS  (PRN):  acetaminophen  Suppository .. 650 milliGRAM(s) Rectal every 6 hours PRN Temp greater or equal to 38C (100.4F), Mild Pain (1 - 3)  LORazepam   Injectable 2 milliGRAM(s) IV Push every 4 hours PRN seizures   PGY-1 Progress Note discussed with attending    PAGER #: [[phone]] TILL 5:00 PM  PLEASE CONTACT ON CALL TEAM:  - On Call Team (Please refer to Esther) FROM 5:00 PM - 8:30PM  - Nightfloat Team FROM 8:30 -7:30 AM    INTERVAL HPI/OVERNIGHT EVENTS:   - Pt has hx of     REVIEW OF SYSTEMS:  CONSTITUTIONAL: No fever, weight loss, or fatigue  RESPIRATORY: No cough, wheezing, chills or hemoptysis; No shortness of breath  CARDIOVASCULAR: No chest pain, palpitations, dizziness, or leg swelling  GASTROINTESTINAL: No abdominal pain. No nausea, vomiting, or hematemesis; No diarrhea or constipation. No melena or hematochezia.  GENITOURINARY: No dysuria or hematuria, urinary frequency  NEUROLOGICAL: No headaches, memory loss, loss of strength, numbness, or tremors  SKIN: No itching, burning, rashes, or lesions     MEDICATIONS  (STANDING):  aspirin  chewable 81 milliGRAM(s) Oral daily  calcium carbonate 1250 mG  + Vitamin D (OsCal 500 + D) 1 Tablet(s) Oral two times a day  cefTRIAXone   IVPB 1000 milliGRAM(s) IV Intermittent every 24 hours  cholecalciferol 1000 Unit(s) Oral daily  donepezil 10 milliGRAM(s) Oral at bedtime  enoxaparin Injectable 40 milliGRAM(s) SubCutaneous daily  folic acid 1 milliGRAM(s) Oral daily  lamoTRIgine 300 milliGRAM(s) Oral two times a day  levETIRAcetam  IVPB 500 milliGRAM(s) IV Intermittent every 12 hours  multivitamin/minerals 1 Tablet(s) Oral daily  pantoprazole  Injectable 40 milliGRAM(s) IV Push daily  PHENobarbital Injectable 65 milliGRAM(s) IV Push every 12 hours  raloxifene 60 milliGRAM(s) Oral daily  senna 2 Tablet(s) Oral at bedtime  sodium chloride 0.9%. 1000 milliLiter(s) (90 mL/Hr) IV Continuous <Continuous>    MEDICATIONS  (PRN):  acetaminophen  Suppository .. 650 milliGRAM(s) Rectal every 6 hours PRN Temp greater or equal to 38C (100.4F), Mild Pain (1 - 3)  LORazepam   Injectable 2 milliGRAM(s) IV Push every 4 hours PRN seizures      Vital Signs Last 24 Hrs  T(C): 36.7 (02 Dec 2021 19:32), Max: 36.7 (02 Dec 2021 07:23)  T(F): 98 (02 Dec 2021 19:32), Max: 98 (02 Dec 2021 07:23)  HR: 74 (02 Dec 2021 19:32) (58 - 130)  BP: 149/89 (02 Dec 2021 19:32) (116/75 - 149/89)  BP(mean): --  RR: 18 (02 Dec 2021 19:32) (18 - 20)  SpO2: 98% (02 Dec 2021 19:32) (96% - 100%)    PHYSICAL EXAMINATION:  GENERAL: NAD, AAOx  HEAD: AT/NC  EYES: conjunctiva and sclera clear  NECK: supple, No JVD noted, Normal thyroid  CHEST/LUNG: CTABL; no rales, rhonchi, wheezing, or rubs  HEART: regular rate and rhythm; no murmurs, rubs, or gallops  ABDOMEN: soft, nontender, nondistended; Bowel sounds present  EXTREMITIES:  2+ Peripheral Pulses, No clubbing, cyanosis, or edema  SKIN: warm dry                          12.7   5.31  )-----------( 222      ( 02 Dec 2021 06:27 )             41.5     12-02    134<L>  |  99  |  14  ----------------------------<  76  3.9   |  28  |  0.39<L>    Ca    9.2      02 Dec 2021 06:27  Phos  2.9     12-01  Mg     2.2     12-01    TPro  7.9  /  Alb  3.3<L>  /  TBili  0.6  /  DBili  x   /  AST  24  /  ALT  22  /  AlkPhos  92  12-02    LIVER FUNCTIONS - ( 02 Dec 2021 06:27 )  Alb: 3.3 g/dL / Pro: 7.9 g/dL / ALK PHOS: 92 U/L / ALT: 22 U/L DA / AST: 24 U/L / GGT: x           CARDIAC MARKERS ( 01 Dec 2021 06:44 )  x     / x     / 92 U/L / x     / x      CARDIAC MARKERS ( 30 Nov 2021 23:49 )  x     / x     / 76 U/L / x     / x            SARS-CoV-2: NotDete (30 Nov 2021 18:49)      CAPILLARY BLOOD GLUCOSE      POCT Blood Glucose.: 88 mg/dL (02 Dec 2021 16:38)  POCT Blood Glucose.: 87 mg/dL (02 Dec 2021 11:51)  POCT Blood Glucose.: 82 mg/dL (02 Dec 2021 04:58)  POCT Blood Glucose.: 76 mg/dL (01 Dec 2021 21:47)      RADIOLOGY & ADDITIONAL TESTS:                   PGY-1 Progress Note discussed with attending    PAGER #: [phone]] TILL 5:00 PM  PLEASE CONTACT ON CALL TEAM:  - On Call Team (Please refer to Esther) FROM 5:00 PM - 8:30PM  - Nightfloat Team FROM 8:30 -7:30 AM    INTERVAL HPI/OVERNIGHT EVENTS:   - Pt has hx of     REVIEW OF SYSTEMS:  CONSTITUTIONAL: No fever, weight loss, or fatigue  RESPIRATORY: No cough, wheezing, chills or hemoptysis; No shortness of breath  CARDIOVASCULAR: No chest pain, palpitations, dizziness, or leg swelling  GASTROINTESTINAL: No abdominal pain. No nausea, vomiting, or hematemesis; No diarrhea or constipation. No melena or hematochezia.  GENITOURINARY: No dysuria or hematuria, urinary frequency  NEUROLOGICAL: No headaches, memory loss, loss of strength, numbness, or tremors  SKIN: No itching, burning, rashes, or lesions     MEDICATIONS  (STANDING):  aspirin  chewable 81 milliGRAM(s) Oral daily  calcium carbonate 1250 mG  + Vitamin D (OsCal 500 + D) 1 Tablet(s) Oral two times a day  cefTRIAXone   IVPB 1000 milliGRAM(s) IV Intermittent every 24 hours  cholecalciferol 1000 Unit(s) Oral daily  donepezil 10 milliGRAM(s) Oral at bedtime  enoxaparin Injectable 40 milliGRAM(s) SubCutaneous daily  folic acid 1 milliGRAM(s) Oral daily  lamoTRIgine 300 milliGRAM(s) Oral two times a day  levETIRAcetam  IVPB 500 milliGRAM(s) IV Intermittent every 12 hours  multivitamin/minerals 1 Tablet(s) Oral daily  pantoprazole  Injectable 40 milliGRAM(s) IV Push daily  PHENobarbital Injectable 65 milliGRAM(s) IV Push every 12 hours  raloxifene 60 milliGRAM(s) Oral daily  senna 2 Tablet(s) Oral at bedtime  sodium chloride 0.9%. 1000 milliLiter(s) (90 mL/Hr) IV Continuous <Continuous>    MEDICATIONS  (PRN):  acetaminophen  Suppository .. 650 milliGRAM(s) Rectal every 6 hours PRN Temp greater or equal to 38C (100.4F), Mild Pain (1 - 3)  LORazepam   Injectable 2 milliGRAM(s) IV Push every 4 hours PRN seizures      Vital Signs Last 24 Hrs  T(C): 36.7 (02 Dec 2021 19:32), Max: 36.7 (02 Dec 2021 07:23)  T(F): 98 (02 Dec 2021 19:32), Max: 98 (02 Dec 2021 07:23)  HR: 74 (02 Dec 2021 19:32) (58 - 130)  BP: 149/89 (02 Dec 2021 19:32) (116/75 - 149/89)  BP(mean): --  RR: 18 (02 Dec 2021 19:32) (18 - 20)  SpO2: 98% (02 Dec 2021 19:32) (96% - 100%)    PHYSICAL EXAMINATION:  GENERAL: NAD, AAOx0  HEAD: AT/NC  EYES: conjunctiva and sclera clear  NECK: supple, No JVD noted, Normal thyroid  CHEST/LUNG: CTABL; no rales, rhonchi, wheezing, or rubs  HEART: regular rate and rhythm; no murmurs, rubs, or gallops  ABDOMEN: soft, nontender, nondistended; Bowel sounds present  EXTREMITIES:  2+ Peripheral Pulses, No clubbing, cyanosis, or edema  SKIN: warm dry                          12.7   5.31  )-----------( 222      ( 02 Dec 2021 06:27 )             41.5     12-02    134<L>  |  99  |  14  ----------------------------<  76  3.9   |  28  |  0.39<L>    Ca    9.2      02 Dec 2021 06:27  Phos  2.9     12-01  Mg     2.2     12-01    TPro  7.9  /  Alb  3.3<L>  /  TBili  0.6  /  DBili  x   /  AST  24  /  ALT  22  /  AlkPhos  92  12-02    LIVER FUNCTIONS - ( 02 Dec 2021 06:27 )  Alb: 3.3 g/dL / Pro: 7.9 g/dL / ALK PHOS: 92 U/L / ALT: 22 U/L DA / AST: 24 U/L / GGT: x           CARDIAC MARKERS ( 01 Dec 2021 06:44 )  x     / x     / 92 U/L / x     / x      CARDIAC MARKERS ( 30 Nov 2021 23:49 )  x     / x     / 76 U/L / x     / x            SARS-CoV-2: NotDete (30 Nov 2021 18:49)      CAPILLARY BLOOD GLUCOSE      POCT Blood Glucose.: 88 mg/dL (02 Dec 2021 16:38)  POCT Blood Glucose.: 87 mg/dL (02 Dec 2021 11:51)  POCT Blood Glucose.: 82 mg/dL (02 Dec 2021 04:58)  POCT Blood Glucose.: 76 mg/dL (01 Dec 2021 21:47)      RADIOLOGY & ADDITIONAL TESTS:

## 2021-12-02 NOTE — INPATIENT CERTIFICATION FOR MEDICARE PATIENTS - IN ORDER TO MEET MEDICARE REQUIREMENTS.
In order to meet Medicare requirements, the clinical documentation must support the information cited in the admission order.  Please be sure to provide detailed and clear documentation about the following in the admitting note/history and physical: Detail Level: Detailed

## 2021-12-02 NOTE — CONSULT NOTE ADULT - ASSESSMENT
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      She is in a NF and we have no reason to suspect "non-compliance."    Sz due to acute illness in Pt with underlying Sz disorder and abnormal brain.      RECOMMENDATION      Continue phenobarb 64.8mg PO BID, or 65 mg parenterally Q 12 hrs.    Continue lamotrigine XR 600mg daily, or (if XR not available) 300mg BID.

## 2021-12-02 NOTE — CHART NOTE - NSCHARTNOTEFT_GEN_A_CORE
Paged by RN that pt didn't urinate overnight and bladder scan showing 350cc.  If pt doesn't urinate during the day, consider straight cath or barroso catheter.

## 2021-12-02 NOTE — CONSULT NOTE ADULT - SUBJECTIVE AND OBJECTIVE BOX
To be completed.  To be completed.     History from Admission H&P    <Start of quote(s) from H&P>  "Reason for Admission: UTI AND SEIZURES  History of Present Illness:   86 year old female, from [facility], bedbound with past medical hx of severe intellectual disability, hypertension, seizures, hyponatremia, schizophrenia, OA, thalassemia trait, dementia, peripheral neuropathy, right breast ca s/p mastectomy and cholecystectomy was BIBEMS for seizures and hypoxia 89% on RA. Story obtained from [employer] documents as brother Devante Funes refused to talk over the phone due to time - 930 pm. He said he knows nothing more than it already says on those documents. He said will call tomorrow am to check on patient's clinical status.     In ED, s/p Levaquin and Zosyn + ASA rectally     GOC Full code      Review of Systems:  Review of Systems: Unable to assess due to intellectual disability      Allergies and Intolerances:        Allergies:  	No Known Allergies:     Home Medications:   * Patient Currently Takes Medications as of 30-Nov-2021 21:43 documented in Structured Notes  · 	folic acid 1 mg oral tablet: Last Dose Taken:  , 1 tab(s) orally once a day  · 	calcium-vitamin D 500 mg-200 intl units oral tablet: Last Dose Taken:  , 1 tab(s) orally 2 times a day  · 	sodium chloride 1 g oral tablet: Last Dose Taken:  , 1 tab(s) orally once a day  · 	senna oral tablet: Last Dose Taken:  , 2 tab(s) orally once a day (at bedtime)  · 	docusate sodium 100 mg oral capsule: Last Dose Taken:  , 1 cap(s) orally 3 times a day  · 	donepezil 10 mg oral tablet: Last Dose Taken:  , 1 tab(s) orally once a day (at bedtime)  · 	amLODIPine 5 mg oral tablet: Last Dose Taken:  , 1 tab(s) orally once a day  · 	raloxifene 60 mg oral tablet: Last Dose Taken:  , 1 tab(s) orally once a day  · 	PHENobarbital 64.8 mg oral tablet: Last Dose Taken:  , 1 tab(s) orally every 12 hours  · 	alendronate 70 mg oral tablet: Last Dose Taken:  , 1 tab(s) orally once a week (on Wednesday)  · 	Thera-Tabs M oral tablet: Last Dose Taken:  , 1 tab(s) orally once a day  · 	lamoTRIgine 200 mg oral tablet, extended release: Last Dose Taken:  , 3 tab(s) orally once a day  · 	Vitamin D3 25 mcg (1000 intl units) oral tablet: Last Dose Taken:  , 1 tab(s) orally once a day    Patient History:    Past Medical, Past Surgical, and Family History:  PAST MEDICAL HISTORY:  Dementia   Hearing loss   Hypertension   Hyponatremia   Osteoarthritis   Peripheral neuropathy   Schizophrenia, unspecified type   Seizure   Severe intellectual disability.     PAST SURGICAL HISTORY:  Family history of cholecystectomy     H/O mastectomy, right.   . . .   As per brother no smoking or ETOH use   . . .    Heart Failure:  Does this patient have a history of or has been diagnosed with heart failure? unknown."  <End of quote(s) from H&P>    Pt is severely demented, and while verbal cannot provide any useful information    EXAMINATION    Awake, alert, restless, supine in bed.  Thin, cachectic.  Asymmetric facies (?congenitally dysmorphic); jaw appears deviated to the R.      Spontaneous speech is mostly unintelligible and dysarthric.  After I asked her a few simple questions she said "how many?" several times.  I finally said "six" which she then repeated several times, and lastly said "6 years."      Does not follow instructions or co-operate with examination.  Gaze appears to be grossly conjugate; pupils appear to be meiotic; closes eyelids tightly when flashlight is shone and when attempt made to examine eyes closely.      Moves all extremities spontaneously (including placing LEs above guardrails) without grossly evident focal or lateralized deficits.  Confrontation testing of strength is not possible.

## 2021-12-02 NOTE — ADVANCED PRACTICE NURSE CONSULT - ASSESSMENT
This is a 86yr old female patient admitted for Plasma Protein Abnormality, presenting with a healed wound to the Coccyx; there is no pressure injury noted upon assessment.

## 2021-12-03 LAB
-  AMIKACIN: SIGNIFICANT CHANGE UP
-  AMOXICILLIN/CLAVULANIC ACID: SIGNIFICANT CHANGE UP
-  AMPICILLIN/SULBACTAM: SIGNIFICANT CHANGE UP
-  AMPICILLIN: SIGNIFICANT CHANGE UP
-  AZTREONAM: SIGNIFICANT CHANGE UP
-  CEFAZOLIN: SIGNIFICANT CHANGE UP
-  CEFEPIME: SIGNIFICANT CHANGE UP
-  CEFOXITIN: SIGNIFICANT CHANGE UP
-  CEFTRIAXONE: SIGNIFICANT CHANGE UP
-  CIPROFLOXACIN: SIGNIFICANT CHANGE UP
-  ERTAPENEM: SIGNIFICANT CHANGE UP
-  GENTAMICIN: SIGNIFICANT CHANGE UP
-  IMIPENEM: SIGNIFICANT CHANGE UP
-  LEVOFLOXACIN: SIGNIFICANT CHANGE UP
-  MEROPENEM: SIGNIFICANT CHANGE UP
-  NITROFURANTOIN: SIGNIFICANT CHANGE UP
-  PIPERACILLIN/TAZOBACTAM: SIGNIFICANT CHANGE UP
-  TIGECYCLINE: SIGNIFICANT CHANGE UP
-  TOBRAMYCIN: SIGNIFICANT CHANGE UP
-  TRIMETHOPRIM/SULFAMETHOXAZOLE: SIGNIFICANT CHANGE UP
ANION GAP SERPL CALC-SCNC: 9 MMOL/L — SIGNIFICANT CHANGE UP (ref 5–17)
BUN SERPL-MCNC: 14 MG/DL — SIGNIFICANT CHANGE UP (ref 7–18)
CALCIUM SERPL-MCNC: 8.5 MG/DL — SIGNIFICANT CHANGE UP (ref 8.4–10.5)
CHLORIDE SERPL-SCNC: 102 MMOL/L — SIGNIFICANT CHANGE UP (ref 96–108)
CO2 SERPL-SCNC: 26 MMOL/L — SIGNIFICANT CHANGE UP (ref 22–31)
CREAT SERPL-MCNC: 0.35 MG/DL — LOW (ref 0.5–1.3)
CULTURE RESULTS: NO GROWTH — SIGNIFICANT CHANGE UP
CULTURE RESULTS: SIGNIFICANT CHANGE UP
GLUCOSE BLDC GLUCOMTR-MCNC: 66 MG/DL — LOW (ref 70–99)
GLUCOSE BLDC GLUCOMTR-MCNC: 79 MG/DL — SIGNIFICANT CHANGE UP (ref 70–99)
GLUCOSE SERPL-MCNC: 77 MG/DL — SIGNIFICANT CHANGE UP (ref 70–99)
HCT VFR BLD CALC: 39.1 % — SIGNIFICANT CHANGE UP (ref 34.5–45)
HGB BLD-MCNC: 12.2 G/DL — SIGNIFICANT CHANGE UP (ref 11.5–15.5)
MCHC RBC-ENTMCNC: 22.1 PG — LOW (ref 27–34)
MCHC RBC-ENTMCNC: 31.2 GM/DL — LOW (ref 32–36)
MCV RBC AUTO: 70.8 FL — LOW (ref 80–100)
METHOD TYPE: SIGNIFICANT CHANGE UP
NRBC # BLD: 0 /100 WBCS — SIGNIFICANT CHANGE UP (ref 0–0)
ORGANISM # SPEC MICROSCOPIC CNT: SIGNIFICANT CHANGE UP
ORGANISM # SPEC MICROSCOPIC CNT: SIGNIFICANT CHANGE UP
PHENOBARB SERPL-MCNC: 13.9 UG/ML — LOW (ref 15–40)
PLATELET # BLD AUTO: 214 K/UL — SIGNIFICANT CHANGE UP (ref 150–400)
POTASSIUM SERPL-MCNC: 3.4 MMOL/L — LOW (ref 3.5–5.3)
POTASSIUM SERPL-SCNC: 3.4 MMOL/L — LOW (ref 3.5–5.3)
RBC # BLD: 5.52 M/UL — HIGH (ref 3.8–5.2)
RBC # FLD: 13.7 % — SIGNIFICANT CHANGE UP (ref 10.3–14.5)
SODIUM SERPL-SCNC: 137 MMOL/L — SIGNIFICANT CHANGE UP (ref 135–145)
SPECIMEN SOURCE: SIGNIFICANT CHANGE UP
SPECIMEN SOURCE: SIGNIFICANT CHANGE UP
WBC # BLD: 4.67 K/UL — SIGNIFICANT CHANGE UP (ref 3.8–10.5)
WBC # FLD AUTO: 4.67 K/UL — SIGNIFICANT CHANGE UP (ref 3.8–10.5)

## 2021-12-03 PROCEDURE — 99233 SBSQ HOSP IP/OBS HIGH 50: CPT | Mod: GC

## 2021-12-03 RX ORDER — SODIUM CHLORIDE 9 MG/ML
1000 INJECTION, SOLUTION INTRAVENOUS
Refills: 0 | Status: DISCONTINUED | OUTPATIENT
Start: 2021-12-03 | End: 2021-12-05

## 2021-12-03 RX ORDER — POTASSIUM CHLORIDE 20 MEQ
10 PACKET (EA) ORAL
Refills: 0 | Status: DISCONTINUED | OUTPATIENT
Start: 2021-12-03 | End: 2021-12-03

## 2021-12-03 RX ORDER — POTASSIUM CHLORIDE 20 MEQ
10 PACKET (EA) ORAL
Refills: 0 | Status: COMPLETED | OUTPATIENT
Start: 2021-12-03 | End: 2021-12-03

## 2021-12-03 RX ADMIN — ENOXAPARIN SODIUM 40 MILLIGRAM(S): 100 INJECTION SUBCUTANEOUS at 12:22

## 2021-12-03 RX ADMIN — DONEPEZIL HYDROCHLORIDE 10 MILLIGRAM(S): 10 TABLET, FILM COATED ORAL at 21:44

## 2021-12-03 RX ADMIN — Medication 65 MILLIGRAM(S): at 18:03

## 2021-12-03 RX ADMIN — Medication 1 TABLET(S): at 05:23

## 2021-12-03 RX ADMIN — PANTOPRAZOLE SODIUM 40 MILLIGRAM(S): 20 TABLET, DELAYED RELEASE ORAL at 14:28

## 2021-12-03 RX ADMIN — Medication 100 MILLIEQUIVALENT(S): at 13:48

## 2021-12-03 RX ADMIN — LEVETIRACETAM 420 MILLIGRAM(S): 250 TABLET, FILM COATED ORAL at 05:12

## 2021-12-03 RX ADMIN — Medication 100 MILLIEQUIVALENT(S): at 11:54

## 2021-12-03 RX ADMIN — LEVETIRACETAM 420 MILLIGRAM(S): 250 TABLET, FILM COATED ORAL at 18:04

## 2021-12-03 RX ADMIN — Medication 100 MILLIEQUIVALENT(S): at 10:59

## 2021-12-03 RX ADMIN — Medication 65 MILLIGRAM(S): at 05:12

## 2021-12-03 RX ADMIN — CEFTRIAXONE 100 MILLIGRAM(S): 500 INJECTION, POWDER, FOR SOLUTION INTRAMUSCULAR; INTRAVENOUS at 21:44

## 2021-12-03 RX ADMIN — SODIUM CHLORIDE 65 MILLILITER(S): 9 INJECTION, SOLUTION INTRAVENOUS at 12:20

## 2021-12-03 RX ADMIN — LAMOTRIGINE 300 MILLIGRAM(S): 25 TABLET, ORALLY DISINTEGRATING ORAL at 05:19

## 2021-12-03 NOTE — PROGRESS NOTE ADULT - PROBLEM SELECTOR PLAN 1
Patient with history of seizures on Lamotrigine and Phenobarbital at home   - Seizure most likely from acute UTI vs medication non compliance   - EKG showed NSR, Trop + x3, CTH negative for acute intracranial pathology  - NPO except meds, FS q6hrs   - Started Ativan 2 mg IVP PRN for breakthrough seizures   - S&S - puree with mod thick liquids spoon fed  -unable to obtain EEG d/t noncompliance  - F/u electrolytes, Utox, Lamotrigine level 2.6 and Keppra levels   - Seizure/aspiration/fall precautions  -c/w Keppra and phenobarbital  - Neuro Dr Oneill consulted.

## 2021-12-03 NOTE — SWALLOW BEDSIDE ASSESSMENT ADULT - SWALLOW EVAL: RECOMMENDED FEEDING/EATING TECHNIQUES
check mouth frequently for oral residue/pocketing/crush medication (when feasible)/maintain upright posture during/after eating for 30 mins/oral hygiene/position upright (90 degrees)

## 2021-12-03 NOTE — SWALLOW BEDSIDE ASSESSMENT ADULT - SLP PERTINENT HISTORY OF CURRENT PROBLEM
86F, from [facility], bedbound with PMHx of severe ID, HTN, seizures, hyponatremia, schizophrenia, OA, thalassemia trait, dementia, peripheral neuropathy, right breast ca s/p mastectomy and cholecystectomy was reportedly BIBEMS for seizures and hypoxia 89% on RA. Story was reportedly obtained from [facility] documents as brother Devante Funes refused to talk over the phone due to time - 930 pm. He reportedly said he knows nothing more than it already says on those documents. He said will call tomorrow am to check on patient's clinical status.

## 2021-12-03 NOTE — PROGRESS NOTE ADULT - PROBLEM SELECTOR PLAN 3
Patient presented with seizures, found to have UA + nitrites, leucocyte esterase and pyuria, leucocytosis, afebrile    - c/w Rocephin IV   UCx - negative

## 2021-12-03 NOTE — SWALLOW BEDSIDE ASSESSMENT ADULT - COMMENTS
Pt elevated to 90° by SLP. AA, O could not be assessed. Pt more amenable for PO intake e/b relatively calm presence as SLP entered room and offered snacks and drinks. Pt highly unintelligible, making tangential remarks (e.g., "is it [the applesauce] cold or hot")? Pt readjusted by AGUSTO Sahu for safer PO intake but Pt continued to list R-laterally which was therefore the position and side the pt was fed on.

## 2021-12-03 NOTE — SWALLOW BEDSIDE ASSESSMENT ADULT - SWALLOW EVAL: DIAGNOSIS
Pt p/w mild oropharyngeal dysphagia w/neurological component c/b mild lateral spillage, mildly reduced oral grading, multiple swallows, and reduced hyolaryngeal elevation. No overt s&s of penetration/ aspiration observed at time of this exam.

## 2021-12-03 NOTE — SWALLOW BEDSIDE ASSESSMENT ADULT - ORAL PREPARATORY PHASE
Within functional limits Reduced oral grading/Lateral loss of bolus Reduced oral grading Poor labial pucker around cup to catch liquid and bring it into oral cavity for efficient swallowing/Lateral loss of bolus

## 2021-12-03 NOTE — PROGRESS NOTE ADULT - SUBJECTIVE AND OBJECTIVE BOX
CHIEF COMPLAINT:Patient is a 86y old  Female who presents with a chief complaint of UTI AND SEIZURES .Pt appears comfortable.    	  REVIEW OF SYSTEMS:    [x ] Unable to obtain    PHYSICAL EXAM:  T(C): 37 (12-03-21 @ 07:22), Max: 37 (12-03-21 @ 07:22)  HR: 60 (12-03-21 @ 07:22) (58 - 79)  BP: 119/76 (12-03-21 @ 07:22) (119/76 - 149/89)  RR: 18 (12-03-21 @ 07:22) (18 - 20)  SpO2: 99% (12-03-21 @ 07:22) (96% - 100%)  Wt(kg): --  I&O's Summary    02 Dec 2021 07:01  -  03 Dec 2021 07:00  --------------------------------------------------------  IN: 100 mL / OUT: 1230 mL / NET: -1130 mL        Appearance: Normal	  HEENT:   Normal oral mucosa, PERRL, EOMI	  Lymphatic: No lymphadenopathy  Cardiovascular: Normal S1 S2, No JVD, No murmurs, No edema  Respiratory: Lungs clear to auscultation	  Gastrointestinal:  Soft, Non-tender, + BS	  Skin: No rashes, No ecchymoses, No cyanosis	  Extremities: Normal range of motion, No clubbing, cyanosis or edema  Vascular: Peripheral pulses palpable 2+ bilaterally    MEDICATIONS  (STANDING):  aspirin  chewable 81 milliGRAM(s) Oral daily  calcium carbonate 1250 mG  + Vitamin D (OsCal 500 + D) 1 Tablet(s) Oral two times a day  cefTRIAXone   IVPB 1000 milliGRAM(s) IV Intermittent every 24 hours  cholecalciferol 1000 Unit(s) Oral daily  donepezil 10 milliGRAM(s) Oral at bedtime  enoxaparin Injectable 40 milliGRAM(s) SubCutaneous daily  folic acid 1 milliGRAM(s) Oral daily  lamoTRIgine 300 milliGRAM(s) Oral two times a day  levETIRAcetam  IVPB 500 milliGRAM(s) IV Intermittent every 12 hours  multivitamin/minerals 1 Tablet(s) Oral daily  pantoprazole  Injectable 40 milliGRAM(s) IV Push daily  PHENobarbital Injectable 65 milliGRAM(s) IV Push every 12 hours  potassium chloride  10 mEq/100 mL IVPB 10 milliEquivalent(s) IV Intermittent every 1 hour  raloxifene 60 milliGRAM(s) Oral daily  senna 2 Tablet(s) Oral at bedtime  sodium chloride 0.9%. 1000 milliLiter(s) (90 mL/Hr) IV Continuous <Continuous>      TELEMETRY: 	nsr    	  	  LABS:	 	    Troponin I, High Sensitivity Result: 1079.2 ng/L (12-01 @ 06:44)  Troponin I, High Sensitivity Result: 1074.3 ng/L (11-30 @ 23:49)  Troponin I, High Sensitivity Result: 301.3 ng/L (11-30 @ 18:49)                            12.2   4.67  )-----------( 214      ( 03 Dec 2021 06:30 )             39.1     12-03    137  |  102  |  14  ----------------------------<  77  3.4<L>   |  26  |  0.35<L>    Ca    8.5      03 Dec 2021 06:30    TPro  7.9  /  Alb  3.3<L>  /  TBili  0.6  /  DBili  x   /  AST  24  /  ALT  22  /  AlkPhos  92  12-02    proBNP: Serum Pro-Brain Natriuretic Peptide: 214 pg/mL (11-30 @ 18:49)    Culture - Blood (11.30.21 @ 21:05)   Specimen Source: .Blood Blood   Culture Results:   No growth to date.

## 2021-12-03 NOTE — PROGRESS NOTE ADULT - PROBLEM SELECTOR PLAN 6
Patient presented with mild-moderate chronic hyponatremia on Sodium tabs at home   resolved  - Goal is to increase 4-6 mEq in 24 hrs, avoid overcorrection  - NS IV to correct NA while NPO  - resume oral tabs after pass S&S - passed w/purree diet but refuses oral meds  - F/u serum osmolarity, urine osmolarity , urine Cr , Urine lytes  - Monitor BMP q12 hrs   - Monitor intake and output   - Seizure and aspiration precautions

## 2021-12-03 NOTE — PROGRESS NOTE ADULT - SUBJECTIVE AND OBJECTIVE BOX
PGY-1 Progress Note discussed with attending    PAGER #: [phone]] TILL 5:00 PM  PLEASE CONTACT ON CALL TEAM:  - On Call Team (Please refer to Esther) FROM 5:00 PM - 8:30PM  - Nightfloat Team FROM 8:30 -7:30 AM    INTERVAL HPI/OVERNIGHT EVENTS:   - Pt is minimally responsive verbally at baseline. Unable to participate in ROS.     REVIEW OF SYSTEMS:  unable to participate d/t MS    MEDICATIONS  (STANDING):  aspirin  chewable 81 milliGRAM(s) Oral daily  calcium carbonate 1250 mG  + Vitamin D (OsCal 500 + D) 1 Tablet(s) Oral two times a day  cefTRIAXone   IVPB 1000 milliGRAM(s) IV Intermittent every 24 hours  cholecalciferol 1000 Unit(s) Oral daily  dextrose 5% + sodium chloride 0.45%. 1000 milliLiter(s) (65 mL/Hr) IV Continuous <Continuous>  donepezil 10 milliGRAM(s) Oral at bedtime  enoxaparin Injectable 40 milliGRAM(s) SubCutaneous daily  folic acid 1 milliGRAM(s) Oral daily  lamoTRIgine 300 milliGRAM(s) Oral two times a day  levETIRAcetam  IVPB 500 milliGRAM(s) IV Intermittent every 12 hours  multivitamin/minerals 1 Tablet(s) Oral daily  pantoprazole  Injectable 40 milliGRAM(s) IV Push daily  PHENobarbital Injectable 65 milliGRAM(s) IV Push every 12 hours  raloxifene 60 milliGRAM(s) Oral daily  senna 2 Tablet(s) Oral at bedtime  sodium chloride 0.9%. 1000 milliLiter(s) (90 mL/Hr) IV Continuous <Continuous>    MEDICATIONS  (PRN):  acetaminophen  Suppository .. 650 milliGRAM(s) Rectal every 6 hours PRN Temp greater or equal to 38C (100.4F), Mild Pain (1 - 3)  LORazepam   Injectable 2 milliGRAM(s) IV Push every 4 hours PRN seizures      Vital Signs Last 24 Hrs  T(C): 36.5 (03 Dec 2021 15:59), Max: 37 (03 Dec 2021 07:22)  T(F): 97.7 (03 Dec 2021 15:59), Max: 98.6 (03 Dec 2021 07:22)  HR: 75 (03 Dec 2021 15:59) (60 - 75)  BP: 135/88 (03 Dec 2021 15:59) (119/76 - 149/89)  BP(mean): --  RR: 18 (03 Dec 2021 15:59) (18 - 20)  SpO2: 96% (03 Dec 2021 15:59) (96% - 100%)    PHYSICAL EXAMINATION:  GENERAL: NAD, AAOx0  HEAD: AT/NC  EYES: conjunctiva and sclera clear  NECK: supple, No JVD noted, Normal thyroid  CHEST/LUNG: CTABL; no rales, rhonchi, wheezing, or rubs  HEART: regular rate and rhythm; no murmurs, rubs, or gallops  ABDOMEN: soft, nontender, nondistended; Bowel sounds present  EXTREMITIES:  2+ Peripheral Pulses, No clubbing, cyanosis, or edema  SKIN: warm dry                          12.2   4.67  )-----------( 214      ( 03 Dec 2021 06:30 )             39.1     12-03    137  |  102  |  14  ----------------------------<  77  3.4<L>   |  26  |  0.35<L>    Ca    8.5      03 Dec 2021 06:30    TPro  7.9  /  Alb  3.3<L>  /  TBili  0.6  /  DBili  x   /  AST  24  /  ALT  22  /  AlkPhos  92  12-02    LIVER FUNCTIONS - ( 02 Dec 2021 06:27 )  Alb: 3.3 g/dL / Pro: 7.9 g/dL / ALK PHOS: 92 U/L / ALT: 22 U/L DA / AST: 24 U/L / GGT: x                 SARS-CoV-2: NotDetec (30 Nov 2021 18:49)      CAPILLARY BLOOD GLUCOSE      POCT Blood Glucose.: 66 mg/dL (03 Dec 2021 11:33)  POCT Blood Glucose.: 85 mg/dL (02 Dec 2021 20:56)      RADIOLOGY & ADDITIONAL TESTS:

## 2021-12-04 LAB
ANION GAP SERPL CALC-SCNC: 7 MMOL/L — SIGNIFICANT CHANGE UP (ref 5–17)
BUN SERPL-MCNC: 11 MG/DL — SIGNIFICANT CHANGE UP (ref 7–18)
CALCIUM SERPL-MCNC: 8.4 MG/DL — SIGNIFICANT CHANGE UP (ref 8.4–10.5)
CHLORIDE SERPL-SCNC: 104 MMOL/L — SIGNIFICANT CHANGE UP (ref 96–108)
CO2 SERPL-SCNC: 27 MMOL/L — SIGNIFICANT CHANGE UP (ref 22–31)
CREAT SERPL-MCNC: 0.33 MG/DL — LOW (ref 0.5–1.3)
GLUCOSE BLDC GLUCOMTR-MCNC: 100 MG/DL — HIGH (ref 70–99)
GLUCOSE BLDC GLUCOMTR-MCNC: 141 MG/DL — HIGH (ref 70–99)
GLUCOSE SERPL-MCNC: 91 MG/DL — SIGNIFICANT CHANGE UP (ref 70–99)
HCT VFR BLD CALC: 35.4 % — SIGNIFICANT CHANGE UP (ref 34.5–45)
HGB BLD-MCNC: 11.2 G/DL — LOW (ref 11.5–15.5)
MCHC RBC-ENTMCNC: 22.3 PG — LOW (ref 27–34)
MCHC RBC-ENTMCNC: 31.6 GM/DL — LOW (ref 32–36)
MCV RBC AUTO: 70.5 FL — LOW (ref 80–100)
NRBC # BLD: 0 /100 WBCS — SIGNIFICANT CHANGE UP (ref 0–0)
PLATELET # BLD AUTO: 196 K/UL — SIGNIFICANT CHANGE UP (ref 150–400)
POTASSIUM SERPL-MCNC: 3.5 MMOL/L — SIGNIFICANT CHANGE UP (ref 3.5–5.3)
POTASSIUM SERPL-SCNC: 3.5 MMOL/L — SIGNIFICANT CHANGE UP (ref 3.5–5.3)
RBC # BLD: 5.02 M/UL — SIGNIFICANT CHANGE UP (ref 3.8–5.2)
RBC # FLD: 14.5 % — SIGNIFICANT CHANGE UP (ref 10.3–14.5)
SODIUM SERPL-SCNC: 138 MMOL/L — SIGNIFICANT CHANGE UP (ref 135–145)
WBC # BLD: 4.89 K/UL — SIGNIFICANT CHANGE UP (ref 3.8–10.5)
WBC # FLD AUTO: 4.89 K/UL — SIGNIFICANT CHANGE UP (ref 3.8–10.5)

## 2021-12-04 PROCEDURE — 99233 SBSQ HOSP IP/OBS HIGH 50: CPT | Mod: GC

## 2021-12-04 RX ADMIN — LEVETIRACETAM 420 MILLIGRAM(S): 250 TABLET, FILM COATED ORAL at 17:27

## 2021-12-04 RX ADMIN — PANTOPRAZOLE SODIUM 40 MILLIGRAM(S): 20 TABLET, DELAYED RELEASE ORAL at 11:46

## 2021-12-04 RX ADMIN — Medication 1 MILLIGRAM(S): at 11:46

## 2021-12-04 RX ADMIN — LEVETIRACETAM 420 MILLIGRAM(S): 250 TABLET, FILM COATED ORAL at 05:46

## 2021-12-04 RX ADMIN — SODIUM CHLORIDE 65 MILLILITER(S): 9 INJECTION, SOLUTION INTRAVENOUS at 18:40

## 2021-12-04 RX ADMIN — LAMOTRIGINE 300 MILLIGRAM(S): 25 TABLET, ORALLY DISINTEGRATING ORAL at 17:27

## 2021-12-04 RX ADMIN — Medication 1 TABLET(S): at 05:44

## 2021-12-04 RX ADMIN — ENOXAPARIN SODIUM 40 MILLIGRAM(S): 100 INJECTION SUBCUTANEOUS at 11:46

## 2021-12-04 RX ADMIN — Medication 1 TABLET(S): at 17:27

## 2021-12-04 RX ADMIN — SENNA PLUS 2 TABLET(S): 8.6 TABLET ORAL at 21:13

## 2021-12-04 RX ADMIN — Medication 1000 UNIT(S): at 11:46

## 2021-12-04 RX ADMIN — Medication 81 MILLIGRAM(S): at 11:46

## 2021-12-04 RX ADMIN — RALOXIFENE HYDROCHLORIDE 60 MILLIGRAM(S): 60 TABLET, COATED ORAL at 11:46

## 2021-12-04 RX ADMIN — CEFTRIAXONE 100 MILLIGRAM(S): 500 INJECTION, POWDER, FOR SOLUTION INTRAMUSCULAR; INTRAVENOUS at 21:13

## 2021-12-04 RX ADMIN — SODIUM CHLORIDE 65 MILLILITER(S): 9 INJECTION, SOLUTION INTRAVENOUS at 11:47

## 2021-12-04 RX ADMIN — Medication 1 TABLET(S): at 11:46

## 2021-12-04 RX ADMIN — Medication 65 MILLIGRAM(S): at 05:56

## 2021-12-04 RX ADMIN — LAMOTRIGINE 300 MILLIGRAM(S): 25 TABLET, ORALLY DISINTEGRATING ORAL at 05:45

## 2021-12-04 RX ADMIN — DONEPEZIL HYDROCHLORIDE 10 MILLIGRAM(S): 10 TABLET, FILM COATED ORAL at 21:13

## 2021-12-04 RX ADMIN — Medication 65 MILLIGRAM(S): at 17:27

## 2021-12-04 NOTE — PROGRESS NOTE ADULT - SUBJECTIVE AND OBJECTIVE BOX
S: Patient appears more alert today, motioning and speaking, but not comprehensible. Eating food with nurse, taking medications by mouth.    O:  Vital Signs Last 24 Hrs  T(C): 36.6 (04 Dec 2021 19:46), Max: 37.1 (04 Dec 2021 13:51)  T(F): 97.8 (04 Dec 2021 19:46), Max: 98.8 (04 Dec 2021 13:51)  HR: 70 (04 Dec 2021 19:46) (70 - 78)  BP: 130/77 (04 Dec 2021 19:46) (115/57 - 147/72)  BP(mean): --  RR: 18 (04 Dec 2021 19:46) (18 - 20)  SpO2: 94% (04 Dec 2021 19:46) (94% - 100%)    GENERAL: NAD, well-developed  HEAD:  Atraumatic, Normocephalic  EYES: EOMI, PERRLA, conjunctiva and sclera clear  NECK: Supple, No JVD  CHEST/LUNG: Clear to auscultation bilaterally; No wheeze  HEART: Regular rate and rhythm; No murmurs, rubs, or gallops  ABDOMEN: Soft, Nontender, Nondistended; Bowel sounds present  EXTREMITIES:  2+ Peripheral Pulses, No clubbing, cyanosis, or edema  PSYCH: AAOx0, baseline  NEUROLOGY: non-focal  SKIN: No rashes or lesions    acetaminophen  Suppository .. 650 milliGRAM(s) Rectal every 6 hours PRN  aspirin  chewable 81 milliGRAM(s) Oral daily  calcium carbonate 1250 mG  + Vitamin D (OsCal 500 + D) 1 Tablet(s) Oral two times a day  cefTRIAXone   IVPB 1000 milliGRAM(s) IV Intermittent every 24 hours  cholecalciferol 1000 Unit(s) Oral daily  dextrose 5% + sodium chloride 0.45%. 1000 milliLiter(s) IV Continuous <Continuous>  donepezil 10 milliGRAM(s) Oral at bedtime  enoxaparin Injectable 40 milliGRAM(s) SubCutaneous daily  folic acid 1 milliGRAM(s) Oral daily  lamoTRIgine 300 milliGRAM(s) Oral two times a day  levETIRAcetam  IVPB 500 milliGRAM(s) IV Intermittent every 12 hours  LORazepam   Injectable 2 milliGRAM(s) IV Push every 4 hours PRN  multivitamin/minerals 1 Tablet(s) Oral daily  pantoprazole  Injectable 40 milliGRAM(s) IV Push daily  PHENobarbital Injectable 65 milliGRAM(s) IV Push every 12 hours  raloxifene 60 milliGRAM(s) Oral daily  senna 2 Tablet(s) Oral at bedtime  sodium chloride 0.9%. 1000 milliLiter(s) IV Continuous <Continuous>                            11.2   4.89  )-----------( 196      ( 04 Dec 2021 06:31 )             35.4       12-04    138  |  104  |  11  ----------------------------<  91  3.5   |  27  |  0.33<L>    Ca    8.4      04 Dec 2021 06:31

## 2021-12-04 NOTE — PROGRESS NOTE ADULT - SUBJECTIVE AND OBJECTIVE BOX
CHIEF COMPLAINT:Patient is a 86y old  Female who presents with a chief complaint of UTI AND SEIZURES.Pt appears comfortable.    	  REVIEW OF SYSTEMS:    [x ] Unable to obtain    PHYSICAL EXAM:  T(C): 36.7 (12-04-21 @ 08:15), Max: 36.7 (12-04-21 @ 04:28)  HR: 70 (12-04-21 @ 08:15) (70 - 78)  BP: 137/68 (12-04-21 @ 08:15) (135/88 - 147/72)  RR: 20 (12-04-21 @ 08:15) (18 - 20)  SpO2: 99% (12-04-21 @ 08:15) (96% - 100%)  Wt(kg): --  I&O's Summary    03 Dec 2021 07:01  -  04 Dec 2021 07:00  --------------------------------------------------------  IN: 0 mL / OUT: 200 mL / NET: -200 mL        Appearance: Normal	  HEENT:   Normal oral mucosa, PERRL, EOMI	  Lymphatic: No lymphadenopathy  Cardiovascular: Normal S1 S2, No JVD, No murmurs, No edema  Respiratory: Lungs clear to auscultation	  Gastrointestinal:  Soft, Non-tender, + BS	  Skin: No rashes, No ecchymoses, No cyanosis	  Extremities: Normal range of motion, No clubbing, cyanosis or edema  Vascular: Peripheral pulses palpable 2+ bilaterally    MEDICATIONS  (STANDING):  aspirin  chewable 81 milliGRAM(s) Oral daily  calcium carbonate 1250 mG  + Vitamin D (OsCal 500 + D) 1 Tablet(s) Oral two times a day  cefTRIAXone   IVPB 1000 milliGRAM(s) IV Intermittent every 24 hours  cholecalciferol 1000 Unit(s) Oral daily  dextrose 5% + sodium chloride 0.45%. 1000 milliLiter(s) (65 mL/Hr) IV Continuous <Continuous>  donepezil 10 milliGRAM(s) Oral at bedtime  enoxaparin Injectable 40 milliGRAM(s) SubCutaneous daily  folic acid 1 milliGRAM(s) Oral daily  lamoTRIgine 300 milliGRAM(s) Oral two times a day  levETIRAcetam  IVPB 500 milliGRAM(s) IV Intermittent every 12 hours  multivitamin/minerals 1 Tablet(s) Oral daily  pantoprazole  Injectable 40 milliGRAM(s) IV Push daily  PHENobarbital Injectable 65 milliGRAM(s) IV Push every 12 hours  raloxifene 60 milliGRAM(s) Oral daily  senna 2 Tablet(s) Oral at bedtime  sodium chloride 0.9%. 1000 milliLiter(s) (90 mL/Hr) IV Continuous <Continuous>      TELEMETRY: nsr	    	  	  LABS:	 	                      11.2   4.89  )-----------( 196      ( 04 Dec 2021 06:31 )             35.4     12-04    138  |  104  |  11  ----------------------------<  91  3.5   |  27  |  0.33<L>    Ca    8.4      04 Dec 2021 06:31      proBNP: Serum Pro-Brain Natriuretic Peptide: 214 pg/mL (11-30 @ 18:49)

## 2021-12-04 NOTE — PROGRESS NOTE ADULT - PROBLEM SELECTOR PLAN 3
Patient presented with seizures, found to have UA + nitrites, leucocyte esterase and pyuria, leucocytosis, afebrile    - c/w Rocephin IV to complete 5 day course of anti-biotics  UCx - negative  Patient retaining urine, barroso placed, will attempt trial of void now that patient more alert

## 2021-12-05 LAB
ANION GAP SERPL CALC-SCNC: 7 MMOL/L — SIGNIFICANT CHANGE UP (ref 5–17)
BUN SERPL-MCNC: 7 MG/DL — SIGNIFICANT CHANGE UP (ref 7–18)
CALCIUM SERPL-MCNC: 8.3 MG/DL — LOW (ref 8.4–10.5)
CHLORIDE SERPL-SCNC: 103 MMOL/L — SIGNIFICANT CHANGE UP (ref 96–108)
CO2 SERPL-SCNC: 28 MMOL/L — SIGNIFICANT CHANGE UP (ref 22–31)
CREAT SERPL-MCNC: 0.41 MG/DL — LOW (ref 0.5–1.3)
CULTURE RESULTS: SIGNIFICANT CHANGE UP
CULTURE RESULTS: SIGNIFICANT CHANGE UP
GLUCOSE BLDC GLUCOMTR-MCNC: 101 MG/DL — HIGH (ref 70–99)
GLUCOSE BLDC GLUCOMTR-MCNC: 102 MG/DL — HIGH (ref 70–99)
GLUCOSE SERPL-MCNC: 92 MG/DL — SIGNIFICANT CHANGE UP (ref 70–99)
HCT VFR BLD CALC: 38.7 % — SIGNIFICANT CHANGE UP (ref 34.5–45)
HGB BLD-MCNC: 12.2 G/DL — SIGNIFICANT CHANGE UP (ref 11.5–15.5)
MCHC RBC-ENTMCNC: 22.1 PG — LOW (ref 27–34)
MCHC RBC-ENTMCNC: 31.5 GM/DL — LOW (ref 32–36)
MCV RBC AUTO: 70.1 FL — LOW (ref 80–100)
NRBC # BLD: 0 /100 WBCS — SIGNIFICANT CHANGE UP (ref 0–0)
PLATELET # BLD AUTO: 206 K/UL — SIGNIFICANT CHANGE UP (ref 150–400)
POTASSIUM SERPL-MCNC: 3.2 MMOL/L — LOW (ref 3.5–5.3)
POTASSIUM SERPL-SCNC: 3.2 MMOL/L — LOW (ref 3.5–5.3)
RBC # BLD: 5.52 M/UL — HIGH (ref 3.8–5.2)
RBC # FLD: 14.2 % — SIGNIFICANT CHANGE UP (ref 10.3–14.5)
SODIUM SERPL-SCNC: 138 MMOL/L — SIGNIFICANT CHANGE UP (ref 135–145)
SPECIMEN SOURCE: SIGNIFICANT CHANGE UP
SPECIMEN SOURCE: SIGNIFICANT CHANGE UP
WBC # BLD: 5.07 K/UL — SIGNIFICANT CHANGE UP (ref 3.8–10.5)
WBC # FLD AUTO: 5.07 K/UL — SIGNIFICANT CHANGE UP (ref 3.8–10.5)

## 2021-12-05 PROCEDURE — 99232 SBSQ HOSP IP/OBS MODERATE 35: CPT | Mod: GC

## 2021-12-05 RX ORDER — SODIUM CHLORIDE 9 MG/ML
1000 INJECTION INTRAMUSCULAR; INTRAVENOUS; SUBCUTANEOUS
Refills: 0 | Status: DISCONTINUED | OUTPATIENT
Start: 2021-12-05 | End: 2021-12-05

## 2021-12-05 RX ORDER — LAMOTRIGINE 25 MG/1
300 TABLET, ORALLY DISINTEGRATING ORAL
Refills: 0 | Status: DISCONTINUED | OUTPATIENT
Start: 2021-12-05 | End: 2021-12-06

## 2021-12-05 RX ORDER — POTASSIUM CHLORIDE 20 MEQ
10 PACKET (EA) ORAL
Refills: 0 | Status: COMPLETED | OUTPATIENT
Start: 2021-12-05 | End: 2021-12-05

## 2021-12-05 RX ORDER — PHENOBARBITAL 60 MG
64.8 TABLET ORAL
Refills: 0 | Status: DISCONTINUED | OUTPATIENT
Start: 2021-12-05 | End: 2021-12-06

## 2021-12-05 RX ORDER — LEVETIRACETAM 250 MG/1
500 TABLET, FILM COATED ORAL
Refills: 0 | Status: DISCONTINUED | OUTPATIENT
Start: 2021-12-05 | End: 2021-12-06

## 2021-12-05 RX ORDER — POTASSIUM CHLORIDE 20 MEQ
20 PACKET (EA) ORAL EVERY 4 HOURS
Refills: 0 | Status: DISCONTINUED | OUTPATIENT
Start: 2021-12-05 | End: 2021-12-05

## 2021-12-05 RX ORDER — POTASSIUM CHLORIDE 20 MEQ
20 PACKET (EA) ORAL
Refills: 0 | Status: DISCONTINUED | OUTPATIENT
Start: 2021-12-05 | End: 2021-12-05

## 2021-12-05 RX ADMIN — DONEPEZIL HYDROCHLORIDE 10 MILLIGRAM(S): 10 TABLET, FILM COATED ORAL at 21:52

## 2021-12-05 RX ADMIN — Medication 1000 UNIT(S): at 13:22

## 2021-12-05 RX ADMIN — Medication 1 MILLIGRAM(S): at 13:22

## 2021-12-05 RX ADMIN — Medication 1 TABLET(S): at 05:41

## 2021-12-05 RX ADMIN — Medication 1 TABLET(S): at 13:22

## 2021-12-05 RX ADMIN — ENOXAPARIN SODIUM 40 MILLIGRAM(S): 100 INJECTION SUBCUTANEOUS at 13:24

## 2021-12-05 RX ADMIN — RALOXIFENE HYDROCHLORIDE 60 MILLIGRAM(S): 60 TABLET, COATED ORAL at 13:22

## 2021-12-05 RX ADMIN — Medication 100 MILLIEQUIVALENT(S): at 13:23

## 2021-12-05 RX ADMIN — Medication 64.8 MILLIGRAM(S): at 17:08

## 2021-12-05 RX ADMIN — Medication 100 MILLIEQUIVALENT(S): at 15:56

## 2021-12-05 RX ADMIN — Medication 1 TABLET(S): at 17:17

## 2021-12-05 RX ADMIN — Medication 100 MILLIEQUIVALENT(S): at 14:42

## 2021-12-05 RX ADMIN — LAMOTRIGINE 300 MILLIGRAM(S): 25 TABLET, ORALLY DISINTEGRATING ORAL at 17:04

## 2021-12-05 RX ADMIN — LEVETIRACETAM 420 MILLIGRAM(S): 250 TABLET, FILM COATED ORAL at 05:41

## 2021-12-05 RX ADMIN — SENNA PLUS 2 TABLET(S): 8.6 TABLET ORAL at 21:52

## 2021-12-05 RX ADMIN — LAMOTRIGINE 300 MILLIGRAM(S): 25 TABLET, ORALLY DISINTEGRATING ORAL at 05:41

## 2021-12-05 RX ADMIN — CEFTRIAXONE 100 MILLIGRAM(S): 500 INJECTION, POWDER, FOR SOLUTION INTRAMUSCULAR; INTRAVENOUS at 21:51

## 2021-12-05 RX ADMIN — Medication 81 MILLIGRAM(S): at 13:22

## 2021-12-05 RX ADMIN — PANTOPRAZOLE SODIUM 40 MILLIGRAM(S): 20 TABLET, DELAYED RELEASE ORAL at 14:15

## 2021-12-05 RX ADMIN — Medication 65 MILLIGRAM(S): at 05:41

## 2021-12-05 RX ADMIN — LEVETIRACETAM 500 MILLIGRAM(S): 250 TABLET, FILM COATED ORAL at 17:17

## 2021-12-05 NOTE — PROGRESS NOTE ADULT - PROBLEM SELECTOR PLAN 6
Patient presented with mild-moderate chronic hyponatremia on Sodium tabs at home   resolved  - Goal is to increase 4-6 mEq in 24 hrs, avoid overcorrection  - NS IV to correct NA while NPO  - resume oral tabs after pass S&S - passed w/purree diet but refuses oral meds  - F/u serum osmolarity, urine osmolarity , urine Cr , Urine lytes  - Monitor BMP q12 hrs   - Monitor intake and output   - Seizure and aspiration precautions Patient presented with mild-moderate chronic hyponatremia on Sodium tabs at home   resolved  - Goal is to increase 4-6 mEq in 24 hrs, avoid overcorrection  - NS IV to correct NA while NPO  - resume oral tabs after pass S&S - passed w/purree diet but refuses oral meds  - Monitor BMP q12 hrs   - Monitor intake and output   - Seizure and aspiration precautions

## 2021-12-05 NOTE — PROGRESS NOTE ADULT - PROBLEM SELECTOR PLAN 3
Patient presented with seizures, found to have UA + nitrites, leucocyte esterase and pyuria, leucocytosis, afebrile    - c/w Rocephin IV   UCx - negative Patient presented with seizures, found to have UA + nitrites, leucocyte esterase and pyuria, leucocytosis, afebrile    - c/w Rocephin IV Day 6, stop after 7 days  UCx - negative

## 2021-12-05 NOTE — PROGRESS NOTE ADULT - PROBLEM SELECTOR PLAN 1
Patient with history of seizures on Lamotrigine and Phenobarbital at home   - Seizure most likely from acute UTI vs medication non compliance   - EKG showed NSR, Trop + x3, CTH negative for acute intracranial pathology  - NPO except meds, FS q6hrs   - Started Ativan 2 mg IVP PRN for breakthrough seizures   - S&S - puree with mod thick liquids spoon fed  -unable to obtain EEG d/t noncompliance  - F/u electrolytes, Utox, Lamotrigine level 2.6 and Keppra levels   - Seizure/aspiration/fall precautions  -c/w Keppra and phenobarbital  - Neuro Dr Oneill consulted. Patient with history of seizures on Lamotrigine and Phenobarbital at home   - Seizure most likely from acute UTI vs medication non compliance   - EKG showed NSR, Trop + x3, CTH negative for acute intracranial pathology  - NPO except meds, FS q6hrs   - Started Ativan 2 mg IVP PRN for breakthrough seizures   - S&S - puree with mod thick liquids spoon fed  -unable to obtain EEG d/t noncompliance  -  Utox pos barbituates,  Lamotrigine level 2.6 and Keppra levels phenobarbital level 13.9 L  - Seizure/aspiration/fall precautions  -c/w Keppra and phenobarbital  - Neuro Dr Oneill consulted.

## 2021-12-05 NOTE — PROGRESS NOTE ADULT - PROBLEM SELECTOR PLAN 7
Lovenox 40mg SC for DVT ppx. Lovenox 40mg SC for DVT ppx  IMPROVE VTE Individual Risk Assessment  RISK                                                                Points  [  ] Previous VTE                                                  3  [  ] Thrombophilia                                               2  [  ] Lower limb paralysis                                      2        (unable to hold up >15 seconds)    [  ] Current Cancer                                              2         (within 6 months)  [  x] Immobilization > 24 hrs                                1  [  ] ICU/CCU stay > 24 hours                              1  [ x ] Age > 60                                                      1  IMPROVE VTE Score __2_______

## 2021-12-05 NOTE — PROGRESS NOTE ADULT - PROBLEM SELECTOR PLAN 2
Hx of advanced dementia and intellectual deficits  No acute issues  Enhanced supervision   Fall precaution. Hx of advanced dementia and intellectual deficits  No acute issues  Fall precaution. Hx of advanced dementia and intellectual deficits  No acute issues  Fall precaution.  Pt not eating or drinking much today - restarted IVF

## 2021-12-05 NOTE — PROGRESS NOTE ADULT - SUBJECTIVE AND OBJECTIVE BOX
CHIEF COMPLAINT:Patient is a 86y old  Female who presents with a chief complaint of UTI AND SEIZURES (05 Dec 2021 09:32)    	  REVIEW OF SYSTEMS:  CONSTITUTIONAL: No fever, weight loss, or fatigue  EYES: No eye pain, visual disturbances, or discharge  ENT:  No difficulty hearing, tinnitus, vertigo; No sinus or throat pain  NECK: No pain or stiffness  RESPIRATORY: No cough, wheezing, chills or hemoptysis; No Shortness of Breath  CARDIOVASCULAR: No chest pain, palpitations, passing out, dizziness, or leg swelling  GASTROINTESTINAL: No abdominal or epigastric pain. No nausea, vomiting, or hematemesis; No diarrhea or constipation. No melena or hematochezia.  GENITOURINARY: No dysuria, frequency, hematuria, or incontinence  NEUROLOGICAL: No headaches, memory loss, loss of strength, numbness, or tremors  SKIN: No itching, burning, rashes, or lesions   LYMPH Nodes: No enlarged glands  ENDOCRINE: No heat or cold intolerance; No hair loss  MUSCULOSKELETAL: No joint pain or swelling; No muscle, back, or extremity pain  PSYCHIATRIC: No depression, anxiety, mood swings, or difficulty sleeping  HEME/LYMPH: No easy bruising, or bleeding gums  ALLERGY AND IMMUNOLOGIC: No hives or eczema	    [ ] All others negative	  [ ] Unable to obtain    PHYSICAL EXAM:  T(C): 36.3 (12-05-21 @ 08:00), Max: 37.1 (12-04-21 @ 13:51)  HR: 65 (12-05-21 @ 08:00) (65 - 78)  BP: 102/54 (12-05-21 @ 08:00) (102/54 - 166/88)  RR: 19 (12-05-21 @ 08:00) (18 - 20)  SpO2: 95% (12-05-21 @ 08:00) (94% - 96%)  Wt(kg): --  I&O's Summary    04 Dec 2021 07:01  -  05 Dec 2021 07:00  --------------------------------------------------------  IN: 780 mL / OUT: 0 mL / NET: 780 mL        Appearance: Normal	  HEENT:   Normal oral mucosa, PERRL, EOMI	  Lymphatic: No lymphadenopathy  Cardiovascular: Normal S1 S2, No JVD, No murmurs, No edema  Respiratory: Lungs clear to auscultation	  Psychiatry: A & O x 3, Mood & affect appropriate  Gastrointestinal:  Soft, Non-tender, + BS	  Skin: No rashes, No ecchymoses, No cyanosis	  Neurologic: Non-focal  Extremities: Normal range of motion, No clubbing, cyanosis or edema  Vascular: Peripheral pulses palpable 2+ bilaterally    MEDICATIONS  (STANDING):  aspirin  chewable 81 milliGRAM(s) Oral daily  calcium carbonate 1250 mG  + Vitamin D (OsCal 500 + D) 1 Tablet(s) Oral two times a day  cefTRIAXone   IVPB 1000 milliGRAM(s) IV Intermittent every 24 hours  cholecalciferol 1000 Unit(s) Oral daily  dextrose 5% + sodium chloride 0.45%. 1000 milliLiter(s) (65 mL/Hr) IV Continuous <Continuous>  donepezil 10 milliGRAM(s) Oral at bedtime  enoxaparin Injectable 40 milliGRAM(s) SubCutaneous daily  folic acid 1 milliGRAM(s) Oral daily  lamoTRIgine 300 milliGRAM(s) Oral two times a day  levETIRAcetam  IVPB 500 milliGRAM(s) IV Intermittent every 12 hours  multivitamin/minerals 1 Tablet(s) Oral daily  pantoprazole  Injectable 40 milliGRAM(s) IV Push daily  PHENobarbital Injectable 65 milliGRAM(s) IV Push every 12 hours  potassium chloride    Tablet ER 20 milliEquivalent(s) Oral every 2 hours  raloxifene 60 milliGRAM(s) Oral daily  senna 2 Tablet(s) Oral at bedtime  sodium chloride 0.9%. 1000 milliLiter(s) (90 mL/Hr) IV Continuous <Continuous>      TELEMETRY: nsr,nsvt	    	  	  LABS:	 	                         12.2   5.07  )-----------( 206      ( 05 Dec 2021 06:38 )             38.7     12-05    138  |  103  |  7   ----------------------------<  92  3.2<L>   |  28  |  0.41<L>    Ca    8.3<L>      05 Dec 2021 06:38      proBNP: Serum Pro-Brain Natriuretic Peptide: 214 pg/mL (11-30 @ 18:49)

## 2021-12-05 NOTE — PROGRESS NOTE ADULT - ATTENDING COMMENTS
87yo M PMHx of severe intellectual disability, seizure disorder presented after seizure event. Per nursing home patient A&Ox0 at baseline, does not verbalize, bedbound, intermittently eats and takes medications. Misses multiple doses of seizure medications. Likely cause of recent seizure especially in setting of UTI. UC growing >100,000 CFU E. Coli. Can change abx on discharge. Patient more alert today, accepting speech evaluation, can resume puree and honey thick diet. Start on D5w-0.45NS for hypoglycemic episode. Na correcting appropriately off salt tabs. Attempt TOV tomorrow now that patient more responsive. Still unable to contact patient family. Transition back to PO medications, if tolerating oral intake can discharge back to NH, likely on Monday.
Patient seen and examined. Case discussed with NP Aamir. In brief, this is a 87 yo F with severe intellectual disability, bedbound, HTN, seizure disorder, chronic hyponatremia, schizophrenia, OA, and dementia who presents with seizure (per notation, <1 minute but no description of the activity), and acute hypoxic respiratory failure to 89% on NC (though unclear amount of O2). Now no longer hypoxic, perhaps possible aspiration event though CXR is unremarkable for pneumonia. Urinalysis concerning for acute cystitis, will continue treatment and follow-up blood and urine cultures. Will continue home seizure medications, phenobarbital, lamotrigine, Keppra. Neurology consulted for further recommendations though possible seizure was in the setting of acute illness with UTI. Patient also with significant enzymes, likely Type II MI due to demand ischemia in the setting of seizure. Cards recs appreciated, will check TTE and start Asa 81mg when no longer NPO. Await speech and swallow evaluation. Remaining care as per NP note above. I called [facility] to discuss patient's plan of care and to obtain collateral but the phone rang and rang with no answer.
Patient with history of seizure, severe intellectual disability presented from nursing home with seizure activity, with sepsis from UTI. Currently treating with ceftriaxone. Continue on seizure medications. Patient currently refusing oral intake, and not cooperating with swallow examination. Unclear what patient baseline mental status is, unable to reach family to discuss. Today patient not urinating, bladder scan with urinary retention, barroso placed. Will attempt TOV when patient is more alert. Repeat UA/UC as previous sample may have been contaminated. Patient refusing and not cooperating with echocardiogram, currently cancelled. If patient more cooperative will obtain. Troponin elevation likely demand ischemia from seizure.
Patient presented with seizure likely due to medication non-compliance. Patient is minimally verbal, bed-bound intermittently takes medications per nursing home report. Initially started on IV anti-epileptics due to refusing PO intake. Troponins elevated, likely demand ischemia from seizure. Patient refusing echocardiogram earlier. Will re-order tomorrow as patient more compliant now. Patient completed trial of void, urinating spontaneously. Patient eating now and taking medications, change anti-epileptics to PO. Patient will complete ceftriaxone tomorrow. Plan for discharge back to NH.

## 2021-12-05 NOTE — PROGRESS NOTE ADULT - SUBJECTIVE AND OBJECTIVE BOX
PGY-1 Progress Note discussed with attending    PAGER #: [[phone]] TILL 5:00 PM  PLEASE CONTACT ON CALL TEAM:  - On Call Team (Please refer to Esther) FROM 5:00 PM - 8:30PM  - Nightfloat Team FROM 8:30 -7:30 AM    INTERVAL HPI/OVERNIGHT EVENTS:   - AAOx0 at baseline. Patient resting in bed. Pt unable to participate in ROS d/t mentas status.     REVIEW OF SYSTEMS:  As stated in HPI    MEDICATIONS  (STANDING):  aspirin  chewable 81 milliGRAM(s) Oral daily  calcium carbonate 1250 mG  + Vitamin D (OsCal 500 + D) 1 Tablet(s) Oral two times a day  cefTRIAXone   IVPB 1000 milliGRAM(s) IV Intermittent every 24 hours  cholecalciferol 1000 Unit(s) Oral daily  dextrose 5% + sodium chloride 0.45%. 1000 milliLiter(s) (65 mL/Hr) IV Continuous <Continuous>  donepezil 10 milliGRAM(s) Oral at bedtime  enoxaparin Injectable 40 milliGRAM(s) SubCutaneous daily  folic acid 1 milliGRAM(s) Oral daily  lamoTRIgine 300 milliGRAM(s) Oral two times a day  levETIRAcetam  IVPB 500 milliGRAM(s) IV Intermittent every 12 hours  multivitamin/minerals 1 Tablet(s) Oral daily  pantoprazole  Injectable 40 milliGRAM(s) IV Push daily  PHENobarbital Injectable 65 milliGRAM(s) IV Push every 12 hours  raloxifene 60 milliGRAM(s) Oral daily  senna 2 Tablet(s) Oral at bedtime  sodium chloride 0.9%. 1000 milliLiter(s) (90 mL/Hr) IV Continuous <Continuous>    MEDICATIONS  (PRN):  acetaminophen  Suppository .. 650 milliGRAM(s) Rectal every 6 hours PRN Temp greater or equal to 38C (100.4F), Mild Pain (1 - 3)  LORazepam   Injectable 2 milliGRAM(s) IV Push every 4 hours PRN seizures      Vital Signs Last 24 Hrs  T(C): 36.4 (05 Dec 2021 05:00), Max: 37.1 (04 Dec 2021 13:51)  T(F): 97.6 (05 Dec 2021 05:00), Max: 98.8 (04 Dec 2021 13:51)  HR: 78 (05 Dec 2021 05:00) (70 - 78)  BP: 138/77 (05 Dec 2021 05:00) (115/57 - 166/88)  BP(mean): --  RR: 20 (05 Dec 2021 05:00) (18 - 20)  SpO2: 95% (05 Dec 2021 05:00) (94% - 96%)    PHYSICAL EXAMINATION:  GENERAL: NAD, AAOx0  HEAD: AT/NC  EYES: conjunctiva and sclera clear  NECK: supple, No JVD noted, Normal thyroid  CHEST/LUNG: CTABL; no rales, rhonchi, wheezing, or rubs  HEART: regular rate and rhythm; no murmurs, rubs, or gallops  ABDOMEN: soft, nontender, nondistended; Bowel sounds present  EXTREMITIES:  2+ Peripheral Pulses, No clubbing, cyanosis, or edema  SKIN: warm dry                          12.2   5.07  )-----------( 206      ( 05 Dec 2021 06:38 )             38.7     12-05    138  |  103  |  7   ----------------------------<  92  3.2<L>   |  28  |  0.41<L>    Ca    8.3<L>      05 Dec 2021 06:38              SARS-CoV-2: NotDetec (30 Nov 2021 18:49)      CAPILLARY BLOOD GLUCOSE      POCT Blood Glucose.: 101 mg/dL (05 Dec 2021 00:46)  POCT Blood Glucose.: 141 mg/dL (04 Dec 2021 11:39)      RADIOLOGY & ADDITIONAL TESTS:                   PGY-1 Progress Note discussed with attending    PAGER #: [[phone]] TILL 5:00 PM  PLEASE CONTACT ON CALL TEAM:  - On Call Team (Please refer to Esther) FROM 5:00 PM - 8:30PM  - Nightfloat Team FROM 8:30 -7:30 AM    INTERVAL HPI/OVERNIGHT EVENTS:   - AAOx0 at baseline. Patient resting in bed. Pt unable to participate in ROS d/t mental status. Patient reassessed in afternoon, found to be sleeping and not consuming much food or water.    REVIEW OF SYSTEMS:  As stated in HPI    MEDICATIONS  (STANDING):  aspirin  chewable 81 milliGRAM(s) Oral daily  calcium carbonate 1250 mG  + Vitamin D (OsCal 500 + D) 1 Tablet(s) Oral two times a day  cefTRIAXone   IVPB 1000 milliGRAM(s) IV Intermittent every 24 hours  cholecalciferol 1000 Unit(s) Oral daily  dextrose 5% + sodium chloride 0.45%. 1000 milliLiter(s) (65 mL/Hr) IV Continuous <Continuous>  donepezil 10 milliGRAM(s) Oral at bedtime  enoxaparin Injectable 40 milliGRAM(s) SubCutaneous daily  folic acid 1 milliGRAM(s) Oral daily  lamoTRIgine 300 milliGRAM(s) Oral two times a day  levETIRAcetam  IVPB 500 milliGRAM(s) IV Intermittent every 12 hours  multivitamin/minerals 1 Tablet(s) Oral daily  pantoprazole  Injectable 40 milliGRAM(s) IV Push daily  PHENobarbital Injectable 65 milliGRAM(s) IV Push every 12 hours  raloxifene 60 milliGRAM(s) Oral daily  senna 2 Tablet(s) Oral at bedtime  sodium chloride 0.9%. 1000 milliLiter(s) (90 mL/Hr) IV Continuous <Continuous>    MEDICATIONS  (PRN):  acetaminophen  Suppository .. 650 milliGRAM(s) Rectal every 6 hours PRN Temp greater or equal to 38C (100.4F), Mild Pain (1 - 3)  LORazepam   Injectable 2 milliGRAM(s) IV Push every 4 hours PRN seizures      Vital Signs Last 24 Hrs  T(C): 36.4 (05 Dec 2021 05:00), Max: 37.1 (04 Dec 2021 13:51)  T(F): 97.6 (05 Dec 2021 05:00), Max: 98.8 (04 Dec 2021 13:51)  HR: 78 (05 Dec 2021 05:00) (70 - 78)  BP: 138/77 (05 Dec 2021 05:00) (115/57 - 166/88)  BP(mean): --  RR: 20 (05 Dec 2021 05:00) (18 - 20)  SpO2: 95% (05 Dec 2021 05:00) (94% - 96%)    PHYSICAL EXAMINATION:  GENERAL: NAD, AAOx0  HEAD: AT/NC  EYES: conjunctiva and sclera clear  NECK: supple, No JVD noted, Normal thyroid  CHEST/LUNG: CTABL; no rales, rhonchi, wheezing, or rubs  HEART: regular rate and rhythm; no murmurs, rubs, or gallops  ABDOMEN: soft, nontender, nondistended; Bowel sounds present  EXTREMITIES:  2+ Peripheral Pulses, No clubbing, cyanosis, or edema  SKIN: warm dry                          12.2   5.07  )-----------( 206      ( 05 Dec 2021 06:38 )             38.7     12-05    138  |  103  |  7   ----------------------------<  92  3.2<L>   |  28  |  0.41<L>    Ca    8.3<L>      05 Dec 2021 06:38              SARS-CoV-2: NotDetec (30 Nov 2021 18:49)      CAPILLARY BLOOD GLUCOSE      POCT Blood Glucose.: 101 mg/dL (05 Dec 2021 00:46)  POCT Blood Glucose.: 141 mg/dL (04 Dec 2021 11:39)      RADIOLOGY & ADDITIONAL TESTS:

## 2021-12-06 ENCOUNTER — TRANSCRIPTION ENCOUNTER (OUTPATIENT)
Age: 86
End: 2021-12-06

## 2021-12-06 VITALS — TEMPERATURE: 97 F | HEART RATE: 69 BPM | OXYGEN SATURATION: 95 % | RESPIRATION RATE: 18 BRPM

## 2021-12-06 LAB
ANION GAP SERPL CALC-SCNC: 6 MMOL/L — SIGNIFICANT CHANGE UP (ref 5–17)
BUN SERPL-MCNC: 4 MG/DL — LOW (ref 7–18)
CALCIUM SERPL-MCNC: 8.7 MG/DL — SIGNIFICANT CHANGE UP (ref 8.4–10.5)
CHLORIDE SERPL-SCNC: 102 MMOL/L — SIGNIFICANT CHANGE UP (ref 96–108)
CO2 SERPL-SCNC: 28 MMOL/L — SIGNIFICANT CHANGE UP (ref 22–31)
CREAT SERPL-MCNC: 0.29 MG/DL — LOW (ref 0.5–1.3)
GLUCOSE BLDC GLUCOMTR-MCNC: 89 MG/DL — SIGNIFICANT CHANGE UP (ref 70–99)
GLUCOSE BLDC GLUCOMTR-MCNC: 90 MG/DL — SIGNIFICANT CHANGE UP (ref 70–99)
GLUCOSE SERPL-MCNC: 87 MG/DL — SIGNIFICANT CHANGE UP (ref 70–99)
HCT VFR BLD CALC: 38 % — SIGNIFICANT CHANGE UP (ref 34.5–45)
HGB BLD-MCNC: 12.1 G/DL — SIGNIFICANT CHANGE UP (ref 11.5–15.5)
LEVETIRACETAM SERPL-MCNC: 10 UG/ML — SIGNIFICANT CHANGE UP (ref 10–40)
MCHC RBC-ENTMCNC: 22.2 PG — LOW (ref 27–34)
MCHC RBC-ENTMCNC: 31.8 GM/DL — LOW (ref 32–36)
MCV RBC AUTO: 69.6 FL — LOW (ref 80–100)
NRBC # BLD: 0 /100 WBCS — SIGNIFICANT CHANGE UP (ref 0–0)
PLATELET # BLD AUTO: 219 K/UL — SIGNIFICANT CHANGE UP (ref 150–400)
POTASSIUM SERPL-MCNC: 3.5 MMOL/L — SIGNIFICANT CHANGE UP (ref 3.5–5.3)
POTASSIUM SERPL-SCNC: 3.5 MMOL/L — SIGNIFICANT CHANGE UP (ref 3.5–5.3)
RBC # BLD: 5.46 M/UL — HIGH (ref 3.8–5.2)
RBC # FLD: 14.1 % — SIGNIFICANT CHANGE UP (ref 10.3–14.5)
SARS-COV-2 RNA SPEC QL NAA+PROBE: SIGNIFICANT CHANGE UP
SODIUM SERPL-SCNC: 136 MMOL/L — SIGNIFICANT CHANGE UP (ref 135–145)
WBC # BLD: 6.3 K/UL — SIGNIFICANT CHANGE UP (ref 3.8–10.5)
WBC # FLD AUTO: 6.3 K/UL — SIGNIFICANT CHANGE UP (ref 3.8–10.5)

## 2021-12-06 PROCEDURE — 99239 HOSP IP/OBS DSCHRG MGMT >30: CPT

## 2021-12-06 RX ORDER — PHENOBARBITAL 60 MG
1 TABLET ORAL
Qty: 0 | Refills: 0 | DISCHARGE
Start: 2021-12-06

## 2021-12-06 RX ORDER — ASPIRIN/CALCIUM CARB/MAGNESIUM 324 MG
1 TABLET ORAL
Qty: 0 | Refills: 0 | DISCHARGE
Start: 2021-12-06

## 2021-12-06 RX ADMIN — LAMOTRIGINE 300 MILLIGRAM(S): 25 TABLET, ORALLY DISINTEGRATING ORAL at 05:55

## 2021-12-06 RX ADMIN — Medication 81 MILLIGRAM(S): at 11:45

## 2021-12-06 RX ADMIN — Medication 1 TABLET(S): at 11:45

## 2021-12-06 RX ADMIN — Medication 1 MILLIGRAM(S): at 11:45

## 2021-12-06 RX ADMIN — LEVETIRACETAM 500 MILLIGRAM(S): 250 TABLET, FILM COATED ORAL at 05:55

## 2021-12-06 RX ADMIN — Medication 1 TABLET(S): at 05:55

## 2021-12-06 RX ADMIN — RALOXIFENE HYDROCHLORIDE 60 MILLIGRAM(S): 60 TABLET, COATED ORAL at 11:45

## 2021-12-06 RX ADMIN — ENOXAPARIN SODIUM 40 MILLIGRAM(S): 100 INJECTION SUBCUTANEOUS at 11:46

## 2021-12-06 RX ADMIN — Medication 64.8 MILLIGRAM(S): at 05:55

## 2021-12-06 RX ADMIN — Medication 1000 UNIT(S): at 11:45

## 2021-12-06 NOTE — PROGRESS NOTE ADULT - PROBLEM SELECTOR PLAN 7
Lovenox 40mg SC for DVT ppx  IMPROVE VTE Individual Risk Assessment  RISK                                                                Points  [  ] Previous VTE                                                  3  [  ] Thrombophilia                                               2  [  ] Lower limb paralysis                                      2        (unable to hold up >15 seconds)    [  ] Current Cancer                                              2         (within 6 months)  [  x] Immobilization > 24 hrs                                1  [  ] ICU/CCU stay > 24 hours                              1  [ x ] Age > 60                                                      1  IMPROVE VTE Score __2_______

## 2021-12-06 NOTE — PROGRESS NOTE ADULT - REASON FOR ADMISSION
UTI AND SEIZURES

## 2021-12-06 NOTE — DISCHARGE NOTE PROVIDER - HOSPITAL COURSE
86 year old female, bedbound with severe intellectual disability, hypertension, seizures, hyponatremia, schizophrenia, OA, thalassemia trait, dementia, peripheral neuropathy is admitted to telemetry for seizures and UTI.    Seizure: Patient with history of seizures on Lamotrigine and Phenobarbital at home, Seizure most likely from acute UTI vs medication non compliance. EEG was not completed as patient was not cooperative. CT head negative for acute findings, but findings limited as pt not able to stay still. Continue to take Keppra, phenobarbital, Lamotrigine.     UTI: found to have UA + nitrites, leucocyte esterase and pyuria. completed 7 days of Rocephin.     Elevated Troponin: Patient with elevated troponin, most likely demand ischemia from seizure activity. EKG NSR, no T wave or ST segment changes. Patient was not cooperative with obtaining echo.      Chronic hyponatremia: resolved with home sodium tabs.    86 year old female, bedbound with severe intellectual disability, hypertension, seizures, hyponatremia, schizophrenia, OA, thalassemia trait, dementia, peripheral neuropathy is admitted to telemetry for seizures and UTI.    Seizure: Patient with history of seizures on Lamotrigine and Phenobarbital at home, Seizure most likely from acute UTI vs medication non compliance. EEG was not completed as patient was not cooperative. CT head negative for acute findings, but findings limited as pt not able to stay still. Continue to take Keppra, phenobarbital, Lamotrigine.     UTI: found to have UA + nitrites, leucocyte esterase and pyuria. completed 7 days of Rocephin.     Elevated Troponin: Patient with elevated troponin, most likely demand ischemia from seizure activity. EKG NSR, no T wave or ST segment changes. Patient was not cooperative with obtaining echo.     Chronic hyponatremia: resolved with home sodium tabs.  Sodium 138 on discharge     Background  and course of admission:            Given patient's improved clinical status and current hemodynamic stability, decision was made to discharge.  Please refer to patient's complete medical chart with documents for a full hospital course, for this is only a brief summary.

## 2021-12-06 NOTE — PROGRESS NOTE ADULT - PROBLEM SELECTOR PLAN 5
Patient has history of Hypertension on amlodipine at home   - C/w amlodipine 5 mg reduced dose for now  - DASH diet when not NPO  - Monitor BP
Patient has history of Hypertension on amlodipine at home   - C/w amlodipine 5 mg reduced dose for now  - DASH diet when not NPO  - Monitor BP and resume to amlodipine 10 mg as needed.
Patient has history of Hypertension on amlodipine at home   - BP relatively normal off amlodipine  - DASH diet
Patient has history of Hypertension on amlodipine at home   - C/w amlodipine 5 mg reduced dose for now  - DASH diet when not NPO  - Monitor BP and resume to amlodipine 10 mg as needed.

## 2021-12-06 NOTE — DISCHARGE NOTE PROVIDER - NSDCCPCAREPLAN_GEN_ALL_CORE_FT
PRINCIPAL DISCHARGE DIAGNOSIS  Diagnosis: Seizure, convulsion  Assessment and Plan of Treatment: Seizures is A disorder where nerve cell activity is disturbed in your brain. This was controlled with Phenobarbitual, Keppra, and Lamotrigine. You denied having an EEG done. Please continue to take your medications as prescribed.      SECONDARY DISCHARGE DIAGNOSES  Diagnosis: Acute UTI  Assessment and Plan of Treatment: You were found to have a urinary tract infection on your admission.    You were treated with 7 days of iv antibiotics in the hospital with the goal of clearing this infection . Urinary tract infections can cause weakness, confusion, or spread to other organ systems in the body. If you experience continued symptoms of infection (fever, chills, weakness, or burning with urination), please follow up with your primary care provider.      Diagnosis: Elevated troponin  Assessment and Plan of Treatment: You were found to have elevated troponins. This is likley due to your seizures. You denied having an echo done inpatient.    Diagnosis: Hyponatremia  Assessment and Plan of Treatment: You have chronic hyponatremia. Continue to take your salt tabs.     PRINCIPAL DISCHARGE DIAGNOSIS  Diagnosis: Seizure, convulsion  Assessment and Plan of Treatment: Seizures is A disorder where nerve cell activity is disturbed in your brain. This was controlled with Phenobarbitual, Keppra, and Lamotrigine. You denied having an EEG done. Please continue to take your medications as prescribed.      SECONDARY DISCHARGE DIAGNOSES  Diagnosis: Hyponatremia  Assessment and Plan of Treatment: You have chronic hyponatremia. Continue to take your salt tabs. please follow up with your Primary care doctor to repeat labs in 1-2 weeks and check your sodium levels    Diagnosis: Acute UTI  Assessment and Plan of Treatment: You were found to have a urinary tract infection on your admission.    You were treated with 7 days of iv antibiotics in the hospital with the goal of clearing this infection . Urinary tract infections can cause weakness, confusion, or spread to other organ systems in the body. If you experience continued symptoms of infection (fever, chills, weakness, or burning with urination), please follow up with your primary care provider.      Diagnosis: Elevated troponin  Assessment and Plan of Treatment: You were found to have elevated troponins. This is likley due to your seizures. You denied having an echo done inpatient. You can follow up with the cardiologist to continue your care     PRINCIPAL DISCHARGE DIAGNOSIS  Diagnosis: Seizure, convulsion  Assessment and Plan of Treatment: Seizures is A disorder where nerve cell activity is disturbed in your brain. This was controlled with Phenobarbitual, Keppra, and Lamotrigine. You denied having an EEG done. Please continue to take your medications as prescribed.      SECONDARY DISCHARGE DIAGNOSES  Diagnosis: Hyponatremia  Assessment and Plan of Treatment: You have chronic hyponatremia. Continue to take your salt tabs. please follow up with your Primary care doctor to repeat labs in 1-2 weeks and check your sodium levels    Diagnosis: Acute UTI  Assessment and Plan of Treatment: You were found to have a urinary tract infection on your admission.    You were treated with 7 days of iv antibiotics in the hospital with the goal of clearing this infection . Urinary tract infections can cause weakness, confusion, or spread to other organ systems in the body. If you experience continued symptoms of infection (fever, chills, weakness, or burning with urination), please follow up with your primary care provider.      Diagnosis: Elevated troponin  Assessment and Plan of Treatment: You were found to have elevated troponins. This is likley due to your seizures. You denied having an echo done inpatient. You can follow up with the cardiologist to continue your care    Diagnosis: Metabolic encephalopathy  Assessment and Plan of Treatment: You were confused on admission. This was likely due to a urinary tract infection and possibly worsened by a seizure. You were treated with anti-biotics and mental status improved to your baseline.

## 2021-12-06 NOTE — DISCHARGE NOTE NURSING/CASE MANAGEMENT/SOCIAL WORK - NSDCPEFALRISK_GEN_ALL_CORE
For information on Fall & Injury Prevention, visit: [email or web address] OR  [email or web address] OR  https://www.cdc.gov/steadi/patient.html

## 2021-12-06 NOTE — PROGRESS NOTE ADULT - PROBLEM SELECTOR PLAN 1
Patient with history of seizures on Lamotrigine and Phenobarbital at home   - Seizure most likely from acute UTI vs medication non compliance   - EKG showed NSR, Trop + x3, CTH negative for acute intracranial pathology  - NPO except meds, FS q6hrs   - Started Ativan 2 mg IVP PRN for breakthrough seizures   - S&S - puree with mod thick liquids spoon fed  -unable to obtain EEG as patient refusing  -  Utox pos barbituates,  Lamotrigine level 2.6 and Keppra levels phenobarbital level 13.9 L  - Seizure/aspiration/fall precautions  -c/w Keppra and phenobarbital, lamotrigine  - Neuro Dr nOeill consulted.

## 2021-12-06 NOTE — PROGRESS NOTE ADULT - SUBJECTIVE AND OBJECTIVE BOX
CHIEF COMPLAINT:Patient is a 86y old  Female who presents with a chief complaint of UTI AND SEIZURES.Pt appears comfortable.    	  REVIEW OF SYSTEMS:  	  [x ] Unable to obtain    PHYSICAL EXAM:  T(C): 36.4 (12-06-21 @ 07:27), Max: 36.6 (12-05-21 @ 11:05)  HR: 64 (12-06-21 @ 07:27) (61 - 74)  BP: 123/82 (12-06-21 @ 07:27) (103/56 - 160/93)  RR: 18 (12-06-21 @ 07:27) (16 - 19)  SpO2: 98% (12-06-21 @ 07:27) (92% - 100%)  Wt(kg): --  I&O's Summary    05 Dec 2021 07:01  -  06 Dec 2021 07:00  --------------------------------------------------------  IN: 275 mL / OUT: 0 mL / NET: 275 mL        Appearance: Normal	  HEENT:   Normal oral mucosa, PERRL, EOMI	  Lymphatic: No lymphadenopathy  Cardiovascular: Normal S1 S2, No JVD, No murmurs, No edema  Respiratory: Lungs clear to auscultation	  Gastrointestinal:  Soft, Non-tender, + BS	  Skin: No rashes, No ecchymoses, No cyanosis	  Extremities: Normal range of motion, No clubbing, cyanosis or edema  Vascular: Peripheral pulses palpable 2+ bilaterally    MEDICATIONS  (STANDING):  aspirin  chewable 81 milliGRAM(s) Oral daily  calcium carbonate 1250 mG  + Vitamin D (OsCal 500 + D) 1 Tablet(s) Oral two times a day  cefTRIAXone   IVPB 1000 milliGRAM(s) IV Intermittent every 24 hours  cholecalciferol 1000 Unit(s) Oral daily  donepezil 10 milliGRAM(s) Oral at bedtime  enoxaparin Injectable 40 milliGRAM(s) SubCutaneous daily  folic acid 1 milliGRAM(s) Oral daily  lamoTRIgine 300 milliGRAM(s) Oral two times a day  levETIRAcetam 500 milliGRAM(s) Oral two times a day  multivitamin/minerals 1 Tablet(s) Oral daily  PHENobarbital 64.8 milliGRAM(s) Oral two times a day  raloxifene 60 milliGRAM(s) Oral daily  senna 2 Tablet(s) Oral at bedtime      TELEMETRY: 	nsr     	  	  LABS:	 	                        12.1   6.30  )-----------( 219      ( 06 Dec 2021 07:38 )             38.0     12-06    136  |  102  |  4<L>  ----------------------------<  87  3.5   |  28  |  0.29<L>    Ca    8.7      06 Dec 2021 07:38      proBNP: Serum Pro-Brain Natriuretic Peptide: 214 pg/mL (11-30 @ 18:49)

## 2021-12-06 NOTE — PROGRESS NOTE ADULT - PROBLEM SELECTOR PROBLEM 2
Encephalopathy chronic

## 2021-12-06 NOTE — PROGRESS NOTE ADULT - PROBLEM SELECTOR PLAN 3
Patient presented with seizures, found to have UA + nitrites, leucocyte esterase and pyuria, leucocytosis, afebrile    - c/w Rocephin IV Day 6, stop after 7 days  UCx - negative

## 2021-12-06 NOTE — PROGRESS NOTE ADULT - ASSESSMENT
86 year old female, from [facility], bedbound with past medical hx of severe intellectual disability, hypertension, seizures, hyponatremia, schizophrenia, OA, thalassemia trait, dementia, peripheral neuropathy, right breast ca s/p mastectomy and cholecystectomy was BIBEMS for seizures and hypoxia 89% on RA. Story obtained from [facility] documents. Patient admitted to medicine for seizure and UTI. Seizure precautions, neurology consulted. CT head negative for acute findings, but findings limited as pt not able to stay still. EEG pending, cardiology consulted , for elevated troponin x 3, pt with pmh of MI, Echo pending. Patient started on Rocephin IV pending blood and urine cultures results.   
86 year old female, from [facility], bedbound with past medical hx of severe intellectual disability, hypertension, seizures, hyponatremia, schizophrenia, OA, thalassemia trait, dementia, peripheral neuropathy, right breast ca s/p mastectomy and cholecystectomy was BIBEMS for seizures and hypoxia 89% on RA. Story obtained from [facility] documents. Patient admitted to medicine for seizure and UTI. Seizure precautions, neurology consulted. CT head negative for acute findings, but findings limited as pt not able to stay still. EEG pending, cardiology consulted , for elevated troponin x 3, pt with pmh of MI, Echo pending. Patient started on Rocephin IV pending blood and urine cultures results.   
86 year old female, from [facility], bedbound with past medical hx of severe intellectual disability, hypertension, seizures, hyponatremia, schizophrenia, OA, thalassemia trait, dementia, peripheral neuropathy, right breast ca s/p mastectomy and cholecystectomy was BIBEMS for seizures and hypoxia 89% on RA. Story obtained from [facility] documents. Patient admitted to medicine for seizure and UTI. Seizure precautions, neurology consulted. CT head negative for acute findings, but findings limited as pt not able to stay still. EEG pending, cardiology consulted , for elevated troponin x 3, pt with pmh of MI, Echo pending. Patient started on Rocephin IV pending blood and urine cultures results.   
86 year old female, from [facility], bedbound with past medical hx of severe intellectual disability, hypertension, seizures, hyponatremia, schizophrenia, OA, thalassemia trait, dementia, peripheral neuropathy, right breast ca s/p mastectomy and cholecystectomy was BIBEMS for seizures and hypoxia 89% on RA. Story obtained from [facility] documents. Patient admitted to medicine for seizure and UTI. Seizure precautions, neurology consulted. CT head negative for acute findings, but findings limited as pt not able to stay still. EEG pending, cardiology consulted , for elevated troponin x 3, pt with pmh of MI, Echo pending. Patient started on Rocephin IV pending blood and urine cultures results.   
86 year old female, from [facility], bedbound with past medical hx of severe intellectual disability, hypertension, seizures, hyponatremia, schizophrenia, OA, thalassemia trait, dementia, peripheral neuropathy, right breast ca s/p mastectomy and cholecystectomy was BIBEMS for seizures and hypoxia 89% on RA,type II MI due to demand ischemia.  1.Seizures-ativan,keppra,phenobarbital-neurology eval.  2.Echocardiogram cancelled due to non- cooperative.  3.Type II MI due to demand ischemia.  4.Replace k+.  5.Cont asa 81mg qd.  6.GI and DVT prophylaxis.
86 year old female, from [facility], bedbound with past medical hx of severe intellectual disability, hypertension, seizures, hyponatremia, schizophrenia, OA, thalassemia trait, dementia, peripheral neuropathy, right breast ca s/p mastectomy and cholecystectomy was BIBEMS for seizures and hypoxia 89% on RA,type II MI due to demand ischemia.  1.Seizures-ativan,keppra,phenobarbital-neurology eval.  2.Echocardiogram.  3.Type II MI due to demand ischemia.  4.UTI-abx.  5.Cont asa 81mg qd.  6.GI and DVT prophylaxis.
86 year old female, from [facility], bedbound with past medical hx of severe intellectual disability, hypertension, seizures, hyponatremia, schizophrenia, OA, thalassemia trait, dementia, peripheral neuropathy, right breast ca s/p mastectomy and cholecystectomy was BIBEMS for seizures and hypoxia 89% on RA,type II MI due to demand ischemia.  1.Seizures-ativan,keppra,phenobarbital-neurology eval.  2.Echocardiogram.  3.Type II MI due to demand ischemia.  4.UTI-abx.  5.Cont asa 81mg qd.  6.GI and DVT prophylaxis.
86 year old female, from [facility], bedbound with past medical hx of severe intellectual disability, hypertension, seizures, hyponatremia, schizophrenia, OA, thalassemia trait, dementia, peripheral neuropathy, right breast ca s/p mastectomy and cholecystectomy was BIBEMS for seizures and hypoxia 89% on RA,type II MI due to demand ischemia.  1.Seizures-ativan,keppra,phenobarbital-neurology eval.  2.Echocardiogram.  3.Type II MI due to demand ischemia.  4.UTI-abx.  5.Cont asa 81mg qd.  6.Replce k+.  7.GI and DVT prophylaxis.
86 year old female, from [facility], bedbound with past medical hx of severe intellectual disability, hypertension, seizures, hyponatremia, schizophrenia, OA, thalassemia trait, dementia, peripheral neuropathy, right breast ca s/p mastectomy and cholecystectomy was BIBEMS for seizures and hypoxia 89% on RA,type II MI due to demand ischemia.  1.Seizures-ativan,keppra,phenobarbital-neurology eval.  2.Echocardiogram .  3.Type II MI due to demand ischemia.  4.Cont asa 81mg qd.  5.GI and DVT prophylaxis.
86 year old female, from [facility], bedbound with past medical hx of severe intellectual disability, hypertension, seizures, hyponatremia, schizophrenia, OA, thalassemia trait, dementia, peripheral neuropathy, right breast ca s/p mastectomy and cholecystectomy was BIBEMS for seizures and hypoxia 89% on RA. Story obtained from [facility] documents. Patient admitted to medicine for seizure and UTI. Seizure precautions, neurology consulted. CT head negative for acute findings, but findings limited as pt not able to stay still. EEG pending, cardiology consulted , for elevated troponin x 3, pt with pmh of MI, Echo pending. Patient started on Rocephin IV pending blood and urine cultures results.   
86 year old female, from [facility], bedbound with past medical hx of severe intellectual disability, hypertension, seizures, hyponatremia, schizophrenia, OA, thalassemia trait, dementia, peripheral neuropathy, right breast ca s/p mastectomy and cholecystectomy was BIBEMS for seizures and hypoxia 89% on RA. Story obtained from [facility] documents. Patient admitted to medicine for seizure and UTI. Seizure precautions, neurology consulted. CT head negative for acute findings, but findings limited as pt not able to stay still. EEG pending, cardiology consulted , for elevated troponin x 3, pt with pmh of MI, Echo pending. Patient started on Rocephin IV pending blood and urine cultures results.

## 2021-12-06 NOTE — PROGRESS NOTE ADULT - PROBLEM SELECTOR PLAN 6
Patient presented with mild-moderate chronic hyponatremia on Sodium tabs at home   resolved  - Goal is to increase 4-6 mEq in 24 hrs, avoid overcorrection  - NS IV to correct NA while NPO  - resume oral tabs after pass S&S - passed w/ purreed diet  - Monitor BMP q12 hrs   - Monitor intake and output   - Seizure and aspiration precautions

## 2021-12-06 NOTE — PROGRESS NOTE ADULT - PROBLEM SELECTOR PROBLEM 6
Chronic hyponatremia

## 2021-12-06 NOTE — DISCHARGE NOTE PROVIDER - NSDCPNSUBOBJ_GEN_ALL_CORE
S: Patient sleeping today, able to be aroused and speaks some words which is baseline. Refusing echocardiogram and EEG.    O:  Vital Signs Last 24 Hrs  T(C): 36.2 (06 Dec 2021 16:11), Max: 36.6 (06 Dec 2021 00:03)  T(F): 97.2 (06 Dec 2021 16:11), Max: 97.8 (06 Dec 2021 00:03)  HR: 69 (06 Dec 2021 16:11) (61 - 69)  BP: 104/69 (06 Dec 2021 11:17) (104/69 - 160/93)  BP(mean): --  RR: 18 (06 Dec 2021 16:11) (16 - 18)  SpO2: 95% (06 Dec 2021 16:11) (95% - 100%)    GENERAL: NAD, well-developed  HEAD:  Atraumatic, Normocephalic  EYES: EOMI, PERRLA, conjunctiva and sclera clear  NECK: Supple, No JVD  CHEST/LUNG: Clear to auscultation bilaterally; No wheeze  HEART: Regular rate and rhythm; No murmurs, rubs, or gallops  ABDOMEN: Soft, Nontender, Nondistended; Bowel sounds present  EXTREMITIES:  2+ Peripheral Pulses, No clubbing, cyanosis, or edema  PSYCH: AAOx0  NEUROLOGY: non-focal, dysarthric  SKIN: No rashes or lesions    acetaminophen  Suppository .. 650 milliGRAM(s) Rectal every 6 hours PRN  aspirin  chewable 81 milliGRAM(s) Oral daily  calcium carbonate 1250 mG  + Vitamin D (OsCal 500 + D) 1 Tablet(s) Oral two times a day  cefTRIAXone   IVPB 1000 milliGRAM(s) IV Intermittent every 24 hours  cholecalciferol 1000 Unit(s) Oral daily  donepezil 10 milliGRAM(s) Oral at bedtime  enoxaparin Injectable 40 milliGRAM(s) SubCutaneous daily  folic acid 1 milliGRAM(s) Oral daily  lamoTRIgine 300 milliGRAM(s) Oral two times a day  levETIRAcetam 500 milliGRAM(s) Oral two times a day  LORazepam   Injectable 2 milliGRAM(s) IV Push every 4 hours PRN  multivitamin/minerals 1 Tablet(s) Oral daily  PHENobarbital 64.8 milliGRAM(s) Oral two times a day  raloxifene 60 milliGRAM(s) Oral daily  senna 2 Tablet(s) Oral at bedtime                            12.1   6.30  )-----------( 219      ( 06 Dec 2021 07:38 )             38.0       12-06    136  |  102  |  4<L>  ----------------------------<  87  3.5   |  28  |  0.29<L>    Ca    8.7      06 Dec 2021 07:38    A/P:  #Seizure  #Metabolic Encephalpathy  #Urinary Tract Infection  #Chronic Hyponatremia  #Medication non-compliance  #Type 2 Myocardial Infarction    Patient presented with a seziure likely due medication non-compliance. Patient had poor mental status, refusing to eat or take medications. Completed treatment course for UTI. Patient now back at baseline mental status.Does not allow echocardiogram or EEG despite multiple attempts. Ready for discharge to Group Home.    Time-based billing (NON-critical care)   35   minutes spent on total encounter; more than 50% of the visit was spent counseling and / or coordinating care by the attending physician.    The necessity of the time spent during the encounter on this date of service was due to:     - Counseling patient on seizure, medication complaince, urinary tract infection  - Coordination of care with  and case management  - Preparation of discharge paperwork

## 2021-12-06 NOTE — DISCHARGE NOTE PROVIDER - NSDCMRMEDTOKEN_GEN_ALL_CORE_FT
alendronate 70 mg oral tablet: 1 tab(s) orally once a week (on Wednesday)  amLODIPine 10 mg oral tablet: 1 tab(s) orally once a day  aspirin 81 mg oral tablet, chewable: 1 tab(s) orally once a day  calcium-vitamin D 500 mg-200 intl units oral tablet: 1 tab(s) orally 2 times a day  docusate sodium 100 mg oral capsule: 1 cap(s) orally 3 times a day  donepezil 10 mg oral tablet: 1 tab(s) orally once a day (at bedtime)  folic acid 1 mg oral tablet: 1 tab(s) orally once a day  Keppra 500 mg oral tablet: 1 tab(s) orally 2 times a day  lamoTRIgine 200 mg oral tablet, extended release: 3 tab(s) orally once a day  PHENobarbital 64.8 mg oral tablet: 1 tab(s) orally 2 times a day  raloxifene 60 mg oral tablet: 1 tab(s) orally once a day  senna oral tablet: 2 tab(s) orally once a day (at bedtime)  sodium chloride 1 g oral tablet: 1 tab(s) orally once a day  Thera-Tabs M oral tablet: 1 tab(s) orally once a day  Vitamin D3 25 mcg (1000 intl units) oral tablet: 1 tab(s) orally once a day

## 2021-12-06 NOTE — DISCHARGE NOTE PROVIDER - CARE PROVIDER_API CALL
[facility]  INTERNAL MEDICINE  [address] [place] [address]  Phone: [phone]  Fax: [phone]  Follow Up Time:

## 2021-12-06 NOTE — DISCHARGE NOTE NURSING/CASE MANAGEMENT/SOCIAL WORK - PATIENT PORTAL LINK FT
You can access the FollowMyHealth Patient Portal offered by [facility] by registering at the following website: [email or web address]. By joining [facility]’s FollowMyHealth portal, you will also be able to view your health information using other applications (apps) compatible with our system.

## 2021-12-06 NOTE — PROGRESS NOTE ADULT - SUBJECTIVE AND OBJECTIVE BOX
S: Patient preferring to sleep, but wakes up and alert when spoken to.    O:  Vital Signs Last 24 Hrs  T(C): 36.2 (06 Dec 2021 16:11), Max: 36.6 (06 Dec 2021 00:03)  T(F): 97.2 (06 Dec 2021 16:11), Max: 97.8 (06 Dec 2021 00:03)  HR: 69 (06 Dec 2021 16:11) (61 - 69)  BP: 104/69 (06 Dec 2021 11:17) (104/69 - 160/93)  BP(mean): --  RR: 18 (06 Dec 2021 16:11) (16 - 18)  SpO2: 95% (06 Dec 2021 16:11) (95% - 100%)    GENERAL: NAD, well-developed  HEAD:  Atraumatic, Normocephalic  EYES: EOMI, PERRLA, conjunctiva and sclera clear  NECK: Supple, No JVD  CHEST/LUNG: Clear to auscultation bilaterally; No wheeze  HEART: Regular rate and rhythm; No murmurs, rubs, or gallops  ABDOMEN: Soft, Nontender, Nondistended; Bowel sounds present  EXTREMITIES:  2+ Peripheral Pulses, No clubbing, cyanosis, or edema  PSYCH: unable to assess given dysathria  NEUROLOGY: non-focal  SKIN: No rashes or lesions    acetaminophen  Suppository .. 650 milliGRAM(s) Rectal every 6 hours PRN  aspirin  chewable 81 milliGRAM(s) Oral daily  calcium carbonate 1250 mG  + Vitamin D (OsCal 500 + D) 1 Tablet(s) Oral two times a day  cefTRIAXone   IVPB 1000 milliGRAM(s) IV Intermittent every 24 hours  cholecalciferol 1000 Unit(s) Oral daily  donepezil 10 milliGRAM(s) Oral at bedtime  enoxaparin Injectable 40 milliGRAM(s) SubCutaneous daily  folic acid 1 milliGRAM(s) Oral daily  lamoTRIgine 300 milliGRAM(s) Oral two times a day  levETIRAcetam 500 milliGRAM(s) Oral two times a day  LORazepam   Injectable 2 milliGRAM(s) IV Push every 4 hours PRN  multivitamin/minerals 1 Tablet(s) Oral daily  PHENobarbital 64.8 milliGRAM(s) Oral two times a day  raloxifene 60 milliGRAM(s) Oral daily  senna 2 Tablet(s) Oral at bedtime                            12.1   6.30  )-----------( 219      ( 06 Dec 2021 07:38 )             38.0       12-06    136  |  102  |  4<L>  ----------------------------<  87  3.5   |  28  |  0.29<L>    Ca    8.7      06 Dec 2021 07:38

## 2021-12-08 LAB — LEVETIRACETAM SERPL-MCNC: 1 UG/ML — LOW (ref 10–40)

## 2021-12-22 PROBLEM — M19.90 UNSPECIFIED OSTEOARTHRITIS, UNSPECIFIED SITE: Chronic | Status: ACTIVE | Noted: 2021-11-30

## 2021-12-22 PROBLEM — E87.1 HYPO-OSMOLALITY AND HYPONATREMIA: Chronic | Status: ACTIVE | Noted: 2021-11-30

## 2021-12-22 PROBLEM — R56.9 UNSPECIFIED CONVULSIONS: Chronic | Status: ACTIVE | Noted: 2021-11-30

## 2021-12-22 PROBLEM — F72 SEVERE INTELLECTUAL DISABILITIES: Chronic | Status: ACTIVE | Noted: 2021-11-30

## 2021-12-22 PROBLEM — I10 ESSENTIAL (PRIMARY) HYPERTENSION: Chronic | Status: ACTIVE | Noted: 2021-11-30

## 2021-12-22 PROCEDURE — 84100 ASSAY OF PHOSPHORUS: CPT

## 2021-12-22 PROCEDURE — 92610 EVALUATE SWALLOWING FUNCTION: CPT

## 2021-12-22 PROCEDURE — 82550 ASSAY OF CK (CPK): CPT

## 2021-12-22 PROCEDURE — 80307 DRUG TEST PRSMV CHEM ANLYZR: CPT

## 2021-12-22 PROCEDURE — 86900 BLOOD TYPING SEROLOGIC ABO: CPT

## 2021-12-22 PROCEDURE — 83880 ASSAY OF NATRIURETIC PEPTIDE: CPT

## 2021-12-22 PROCEDURE — 83930 ASSAY OF BLOOD OSMOLALITY: CPT

## 2021-12-22 PROCEDURE — 96375 TX/PRO/DX INJ NEW DRUG ADDON: CPT

## 2021-12-22 PROCEDURE — 84484 ASSAY OF TROPONIN QUANT: CPT

## 2021-12-22 PROCEDURE — 82962 GLUCOSE BLOOD TEST: CPT

## 2021-12-22 PROCEDURE — 80175 DRUG SCREEN QUAN LAMOTRIGINE: CPT

## 2021-12-22 PROCEDURE — 80184 ASSAY OF PHENOBARBITAL: CPT

## 2021-12-22 PROCEDURE — 96374 THER/PROPH/DIAG INJ IV PUSH: CPT

## 2021-12-22 PROCEDURE — 99285 EMERGENCY DEPT VISIT HI MDM: CPT | Mod: 25

## 2021-12-22 PROCEDURE — 84300 ASSAY OF URINE SODIUM: CPT

## 2021-12-22 PROCEDURE — 0225U NFCT DS DNA&RNA 21 SARSCOV2: CPT

## 2021-12-22 PROCEDURE — 82570 ASSAY OF URINE CREATININE: CPT

## 2021-12-22 PROCEDURE — 83735 ASSAY OF MAGNESIUM: CPT

## 2021-12-22 PROCEDURE — 80048 BASIC METABOLIC PNL TOTAL CA: CPT

## 2021-12-22 PROCEDURE — 87077 CULTURE AEROBIC IDENTIFY: CPT

## 2021-12-22 PROCEDURE — 83605 ASSAY OF LACTIC ACID: CPT

## 2021-12-22 PROCEDURE — 87186 SC STD MICRODIL/AGAR DIL: CPT

## 2021-12-22 PROCEDURE — 83935 ASSAY OF URINE OSMOLALITY: CPT

## 2021-12-22 PROCEDURE — 86901 BLOOD TYPING SEROLOGIC RH(D): CPT

## 2021-12-22 PROCEDURE — 86850 RBC ANTIBODY SCREEN: CPT

## 2021-12-22 PROCEDURE — 82803 BLOOD GASES ANY COMBINATION: CPT

## 2021-12-22 PROCEDURE — 85027 COMPLETE CBC AUTOMATED: CPT

## 2021-12-22 PROCEDURE — 87040 BLOOD CULTURE FOR BACTERIA: CPT

## 2021-12-22 PROCEDURE — 84540 ASSAY OF URINE/UREA-N: CPT

## 2021-12-22 PROCEDURE — 87635 SARS-COV-2 COVID-19 AMP PRB: CPT

## 2021-12-22 PROCEDURE — 70450 CT HEAD/BRAIN W/O DYE: CPT | Mod: MA

## 2021-12-22 PROCEDURE — 36415 COLL VENOUS BLD VENIPUNCTURE: CPT

## 2021-12-22 PROCEDURE — 80177 DRUG SCRN QUAN LEVETIRACETAM: CPT

## 2021-12-22 PROCEDURE — 71045 X-RAY EXAM CHEST 1 VIEW: CPT

## 2021-12-22 PROCEDURE — 80053 COMPREHEN METABOLIC PANEL: CPT

## 2021-12-22 PROCEDURE — 93005 ELECTROCARDIOGRAM TRACING: CPT

## 2021-12-22 PROCEDURE — 81001 URINALYSIS AUTO W/SCOPE: CPT

## 2021-12-22 PROCEDURE — 87086 URINE CULTURE/COLONY COUNT: CPT

## 2021-12-22 PROCEDURE — 82009 KETONE BODYS QUAL: CPT

## 2021-12-22 PROCEDURE — 85025 COMPLETE CBC W/AUTO DIFF WBC: CPT

## 2022-02-02 ENCOUNTER — OUTPATIENT (OUTPATIENT)
Dept: OUTPATIENT SERVICES | Facility: HOSPITAL | Age: 87
LOS: 1 days | Discharge: HOME | End: 2022-02-02
Payer: MEDICARE

## 2022-02-02 ENCOUNTER — APPOINTMENT (OUTPATIENT)
Dept: RADIOLOGY | Facility: HOSPITAL | Age: 87
End: 2022-02-02

## 2022-02-02 DIAGNOSIS — Z90.11 ACQUIRED ABSENCE OF RIGHT BREAST AND NIPPLE: Chronic | ICD-10-CM

## 2022-02-02 DIAGNOSIS — R13.12 DYSPHAGIA, OROPHARYNGEAL PHASE: ICD-10-CM

## 2022-02-02 DIAGNOSIS — Z84.89 FAMILY HISTORY OF OTHER SPECIFIED CONDITIONS: Chronic | ICD-10-CM

## 2022-02-02 PROCEDURE — 70371 SPEECH EVALUATION COMPLEX: CPT | Mod: 26

## 2022-03-15 NOTE — PROGRESS NOTE ADULT - PROBLEM SELECTOR PLAN 4
Patient with elevated troponin 301, most likely demand ischemia from seizure activity  - EKG NSR, no T wave or ST segment changes   - CXR no acute lung pathology   - troponin x 3 positive  - Cardio dr. Conway consulted  -echo - unable to obtain
Patient with elevated troponin 301, most likely demand ischemia from seizure activity  - EKG NSR, no T wave or ST segment changes   - CXR no acute lung pathology   - troponin x 3 positive  - Cardio dr. Conway consulted  -echo - unable to obtain
Patient with elevated troponin 301, most likely demand ischemia from seizure activity  - EKG NSR, no T wave or ST segment changes   - CXR no acute lung pathology   - troponin x 3 elevated and downtrending  - Cardio dr. Conway consulted  -echo - unable to obtain as patient refusing
Patient with elevated troponin 301, most likely demand ischemia from seizure activity  - EKG NSR, no T wave or ST segment changes   - CXR no acute lung pathology   - troponin x 3 positive  - Cardio dr. Conway consulted  -echo - unable to obtain due to patient refusal, will re-address with cardiology if necessary
Patient with elevated troponin 301, most likely demand ischemia from seizure activity  - EKG NSR, no T wave or ST segment changes   - CXR no acute lung pathology   - troponin x 3 positive  - Cardio dr. Conway consulted  -f/u echo.
Patient with elevated troponin 301, most likely demand ischemia from seizure activity  - EKG NSR, no T wave or ST segment changes   - CXR no acute lung pathology   - troponin x 3 positive  - Cardio dr. Conway consulted  -f/u echo.
Single Mechanical/Accidental Fall

## 2023-07-18 NOTE — ED ADULT NURSE NOTE - NS ED NURSE LEVEL OF CONSCIOUSNESS MENTAL STATUS
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
Unresponsive